# Patient Record
Sex: FEMALE | Race: WHITE | NOT HISPANIC OR LATINO | Employment: OTHER | URBAN - METROPOLITAN AREA
[De-identification: names, ages, dates, MRNs, and addresses within clinical notes are randomized per-mention and may not be internally consistent; named-entity substitution may affect disease eponyms.]

---

## 2023-08-01 LAB
ALANINE AMINOTRANSFERASE (SGPT) (U/L) IN SER/PLAS: 11 U/L (ref 7–45)
ALBUMIN (G/DL) IN SER/PLAS: 4.3 G/DL (ref 3.4–5)
ALKALINE PHOSPHATASE (U/L) IN SER/PLAS: 30 U/L (ref 33–136)
ANION GAP IN SER/PLAS: 10 MMOL/L (ref 10–20)
ASPARTATE AMINOTRANSFERASE (SGOT) (U/L) IN SER/PLAS: 14 U/L (ref 9–39)
BASOPHILS (10*3/UL) IN BLOOD BY AUTOMATED COUNT: 0.1 X10E9/L (ref 0–0.1)
BASOPHILS/100 LEUKOCYTES IN BLOOD BY AUTOMATED COUNT: 1.7 % (ref 0–2)
BILIRUBIN TOTAL (MG/DL) IN SER/PLAS: 0.4 MG/DL (ref 0–1.2)
CALCIUM (MG/DL) IN SER/PLAS: 8.9 MG/DL (ref 8.6–10.3)
CARBON DIOXIDE, TOTAL (MMOL/L) IN SER/PLAS: 32 MMOL/L (ref 21–32)
CHLORIDE (MMOL/L) IN SER/PLAS: 102 MMOL/L (ref 98–107)
CHOLESTEROL (MG/DL) IN SER/PLAS: 201 MG/DL (ref 0–199)
CHOLESTEROL IN HDL (MG/DL) IN SER/PLAS: 70.8 MG/DL
CHOLESTEROL/HDL RATIO: 2.8
CREATININE (MG/DL) IN SER/PLAS: 0.69 MG/DL (ref 0.5–1.05)
EOSINOPHILS (10*3/UL) IN BLOOD BY AUTOMATED COUNT: 0.17 X10E9/L (ref 0–0.4)
EOSINOPHILS/100 LEUKOCYTES IN BLOOD BY AUTOMATED COUNT: 2.9 % (ref 0–6)
ERYTHROCYTE DISTRIBUTION WIDTH (RATIO) BY AUTOMATED COUNT: 12.4 % (ref 11.5–14.5)
ERYTHROCYTE MEAN CORPUSCULAR HEMOGLOBIN CONCENTRATION (G/DL) BY AUTOMATED: 33.4 G/DL (ref 32–36)
ERYTHROCYTE MEAN CORPUSCULAR VOLUME (FL) BY AUTOMATED COUNT: 93 FL (ref 80–100)
ERYTHROCYTES (10*6/UL) IN BLOOD BY AUTOMATED COUNT: 4.05 X10E12/L (ref 4–5.2)
ESTIMATED AVERAGE GLUCOSE FOR HBA1C: 134 MG/DL
GFR FEMALE: >90 ML/MIN/1.73M2
GLUCOSE (MG/DL) IN SER/PLAS: 87 MG/DL (ref 74–99)
HEMATOCRIT (%) IN BLOOD BY AUTOMATED COUNT: 37.7 % (ref 36–46)
HEMOGLOBIN (G/DL) IN BLOOD: 12.6 G/DL (ref 12–16)
HEMOGLOBIN A1C/HEMOGLOBIN TOTAL IN BLOOD: 6.3 %
IMMATURE GRANULOCYTES/100 LEUKOCYTES IN BLOOD BY AUTOMATED COUNT: 0.2 % (ref 0–0.9)
LDL: 115 MG/DL (ref 0–99)
LEUKOCYTES (10*3/UL) IN BLOOD BY AUTOMATED COUNT: 5.9 X10E9/L (ref 4.4–11.3)
LYMPHOCYTES (10*3/UL) IN BLOOD BY AUTOMATED COUNT: 2.36 X10E9/L (ref 0.8–3)
LYMPHOCYTES/100 LEUKOCYTES IN BLOOD BY AUTOMATED COUNT: 40 % (ref 13–44)
MONOCYTES (10*3/UL) IN BLOOD BY AUTOMATED COUNT: 0.53 X10E9/L (ref 0.05–0.8)
MONOCYTES/100 LEUKOCYTES IN BLOOD BY AUTOMATED COUNT: 9 % (ref 2–10)
NEUTROPHILS (10*3/UL) IN BLOOD BY AUTOMATED COUNT: 2.73 X10E9/L (ref 1.6–5.5)
NEUTROPHILS/100 LEUKOCYTES IN BLOOD BY AUTOMATED COUNT: 46.2 % (ref 40–80)
PLATELETS (10*3/UL) IN BLOOD AUTOMATED COUNT: 279 X10E9/L (ref 150–450)
POTASSIUM (MMOL/L) IN SER/PLAS: 4.5 MMOL/L (ref 3.5–5.3)
PROTEIN TOTAL: 6.5 G/DL (ref 6.4–8.2)
SODIUM (MMOL/L) IN SER/PLAS: 139 MMOL/L (ref 136–145)
TRIGLYCERIDE (MG/DL) IN SER/PLAS: 77 MG/DL (ref 0–149)
UREA NITROGEN (MG/DL) IN SER/PLAS: 12 MG/DL (ref 6–23)
VLDL: 15 MG/DL (ref 0–40)

## 2023-08-07 ENCOUNTER — OFFICE VISIT (OUTPATIENT)
Dept: PRIMARY CARE | Facility: CLINIC | Age: 73
End: 2023-08-07
Payer: MEDICARE

## 2023-08-07 VITALS
RESPIRATION RATE: 18 BRPM | TEMPERATURE: 97.3 F | DIASTOLIC BLOOD PRESSURE: 75 MMHG | SYSTOLIC BLOOD PRESSURE: 119 MMHG | HEART RATE: 62 BPM | HEIGHT: 63 IN | BODY MASS INDEX: 28.24 KG/M2 | OXYGEN SATURATION: 94 % | WEIGHT: 159.4 LBS

## 2023-08-07 DIAGNOSIS — R73.03 PREDIABETES: ICD-10-CM

## 2023-08-07 DIAGNOSIS — I10 BENIGN ESSENTIAL HYPERTENSION: Primary | ICD-10-CM

## 2023-08-07 DIAGNOSIS — J30.2 SEASONAL ALLERGIES: ICD-10-CM

## 2023-08-07 DIAGNOSIS — M81.0 AGE-RELATED OSTEOPOROSIS WITHOUT CURRENT PATHOLOGICAL FRACTURE: ICD-10-CM

## 2023-08-07 DIAGNOSIS — K21.9 GASTROESOPHAGEAL REFLUX DISEASE WITHOUT ESOPHAGITIS: ICD-10-CM

## 2023-08-07 PROBLEM — M65.30 TRIGGER FINGER: Status: ACTIVE | Noted: 2023-08-07

## 2023-08-07 PROBLEM — M75.20 BICEPS TENDONITIS: Status: RESOLVED | Noted: 2023-08-07 | Resolved: 2023-08-07

## 2023-08-07 PROBLEM — I73.00 RAYNAUD'S DISEASE WITHOUT GANGRENE: Status: ACTIVE | Noted: 2023-08-07

## 2023-08-07 PROBLEM — M19.049 ARTHRITIS OF HAND: Status: ACTIVE | Noted: 2023-08-07

## 2023-08-07 PROBLEM — N39.46 MIXED STRESS AND URGE URINARY INCONTINENCE: Status: ACTIVE | Noted: 2023-08-07

## 2023-08-07 PROCEDURE — 99214 OFFICE O/P EST MOD 30 MIN: CPT | Performed by: FAMILY MEDICINE

## 2023-08-07 PROCEDURE — 1159F MED LIST DOCD IN RCRD: CPT | Performed by: FAMILY MEDICINE

## 2023-08-07 PROCEDURE — 3074F SYST BP LT 130 MM HG: CPT | Performed by: FAMILY MEDICINE

## 2023-08-07 PROCEDURE — 1170F FXNL STATUS ASSESSED: CPT | Performed by: FAMILY MEDICINE

## 2023-08-07 PROCEDURE — 1036F TOBACCO NON-USER: CPT | Performed by: FAMILY MEDICINE

## 2023-08-07 PROCEDURE — 3078F DIAST BP <80 MM HG: CPT | Performed by: FAMILY MEDICINE

## 2023-08-07 RX ORDER — SIMETHICONE 125 MG
TABLET,CHEWABLE ORAL
COMMUNITY
Start: 2022-11-21 | End: 2023-08-07 | Stop reason: ALTCHOICE

## 2023-08-07 RX ORDER — METFORMIN HYDROCHLORIDE 500 MG/1
500 TABLET, EXTENDED RELEASE ORAL DAILY
Qty: 90 TABLET | Refills: 3 | Status: SHIPPED | OUTPATIENT
Start: 2023-08-07 | End: 2024-01-04 | Stop reason: SDUPTHER

## 2023-08-07 RX ORDER — LOSARTAN POTASSIUM AND HYDROCHLOROTHIAZIDE 25; 100 MG/1; MG/1
1 TABLET ORAL DAILY
Qty: 90 TABLET | Refills: 3 | Status: SHIPPED | OUTPATIENT
Start: 2023-08-07 | End: 2024-01-04 | Stop reason: SDUPTHER

## 2023-08-07 RX ORDER — ASCORBIC ACID 500 MG
TABLET,CHEWABLE ORAL
COMMUNITY
Start: 2022-11-21

## 2023-08-07 RX ORDER — FLUTICASONE PROPIONATE 50 MCG
1 SPRAY, SUSPENSION (ML) NASAL DAILY
COMMUNITY

## 2023-08-07 RX ORDER — OMEPRAZOLE 40 MG/1
40 CAPSULE, DELAYED RELEASE ORAL DAILY
Qty: 90 CAPSULE | Refills: 3 | Status: SHIPPED | OUTPATIENT
Start: 2023-08-07 | End: 2024-08-06

## 2023-08-07 RX ORDER — ALENDRONATE SODIUM 70 MG/1
70 TABLET ORAL
Qty: 12 TABLET | Refills: 3 | Status: SHIPPED | OUTPATIENT
Start: 2023-08-07 | End: 2023-08-07 | Stop reason: SDUPTHER

## 2023-08-07 RX ORDER — METFORMIN HYDROCHLORIDE 500 MG/1
500 TABLET, EXTENDED RELEASE ORAL DAILY
COMMUNITY
Start: 2023-06-22 | End: 2023-08-07 | Stop reason: SDUPTHER

## 2023-08-07 RX ORDER — LOSARTAN POTASSIUM AND HYDROCHLOROTHIAZIDE 25; 100 MG/1; MG/1
1 TABLET ORAL DAILY
COMMUNITY
End: 2023-08-07 | Stop reason: SDUPTHER

## 2023-08-07 RX ORDER — ASPIRIN 81 MG/1
TABLET ORAL
COMMUNITY
Start: 2021-11-12

## 2023-08-07 RX ORDER — ALENDRONATE SODIUM 70 MG/1
70 TABLET ORAL
Qty: 12 TABLET | Refills: 3 | Status: SHIPPED | OUTPATIENT
Start: 2023-08-07 | End: 2024-02-07 | Stop reason: SINTOL

## 2023-08-07 RX ORDER — ALENDRONATE SODIUM 70 MG/1
70 TABLET ORAL
COMMUNITY
Start: 2023-08-07 | End: 2023-08-07 | Stop reason: SDUPTHER

## 2023-08-07 RX ORDER — OMEPRAZOLE 40 MG/1
40 CAPSULE, DELAYED RELEASE ORAL DAILY
COMMUNITY
Start: 2021-11-12 | End: 2023-08-07 | Stop reason: SDUPTHER

## 2023-08-07 ASSESSMENT — ACTIVITIES OF DAILY LIVING (ADL)
NEEDS ASSISTANCE WITH FOOD: INDEPENDENT
WALKS IN HOME: INDEPENDENT
HEARING - RIGHT EAR: FUNCTIONAL
TOILETING: INDEPENDENT
PILL BOX USED: YES
JUDGMENT_ADEQUATE_SAFELY_COMPLETE_DAILY_ACTIVITIES: YES
USING TRANSPORTATION: INDEPENDENT
DOING HOUSEWORK: INDEPENDENT
FEEDING YOURSELF: INDEPENDENT
GROCERY SHOPPING: INDEPENDENT
MANAGING FINANCES: INDEPENDENT
ASSISTIVE_DEVICE: EYEGLASSES
STIL DRIVING: YES
USING TELEPHONE: INDEPENDENT
PREPARING MEALS: INDEPENDENT
ADEQUATE_TO_COMPLETE_ADL: YES
HEARING - LEFT EAR: FUNCTIONAL
DRESSING YOURSELF: INDEPENDENT
EATING: INDEPENDENT
BATHING: INDEPENDENT
PATIENT'S MEMORY ADEQUATE TO SAFELY COMPLETE DAILY ACTIVITIES?: YES
GROOMING: INDEPENDENT

## 2023-08-07 ASSESSMENT — ENCOUNTER SYMPTOMS
LOSS OF SENSATION IN FEET: 0
OCCASIONAL FEELINGS OF UNSTEADINESS: 0
DEPRESSION: 0

## 2023-08-07 ASSESSMENT — COLUMBIA-SUICIDE SEVERITY RATING SCALE - C-SSRS
1. IN THE PAST MONTH, HAVE YOU WISHED YOU WERE DEAD OR WISHED YOU COULD GO TO SLEEP AND NOT WAKE UP?: NO
6. HAVE YOU EVER DONE ANYTHING, STARTED TO DO ANYTHING, OR PREPARED TO DO ANYTHING TO END YOUR LIFE?: NO
2. HAVE YOU ACTUALLY HAD ANY THOUGHTS OF KILLING YOURSELF?: NO

## 2023-08-07 ASSESSMENT — PATIENT HEALTH QUESTIONNAIRE - PHQ9
1. LITTLE INTEREST OR PLEASURE IN DOING THINGS: NOT AT ALL
2. FEELING DOWN, DEPRESSED OR HOPELESS: NOT AT ALL
SUM OF ALL RESPONSES TO PHQ9 QUESTIONS 1 AND 2: 0

## 2023-08-07 NOTE — PROGRESS NOTES
Subjective   Patient ID: Aminta Renae is a 73 y.o. female who presents for Follow-up.    Here for 6 month follow up. Reviewed prior note with patient.     Prediabetes - on Metformin without issues. Healthy diet. Had labs done.     Osteoporosis - started Alendronate last visit and tolerating it well. On Calcium and Vitamin D as well.    HTN - has been stable on medications. No side effects. Feeling well.     Reviewed medications andf reconciled with prior chart.                Current Outpatient Medications:     ascorbic acid (Strawberry C) 500 mg chewable tablet, Chew., Disp: , Rfl:     aspirin 81 mg EC tablet, Take by mouth., Disp: , Rfl:     fluticasone (Flonase) 50 mcg/actuation nasal spray, Administer 1 spray into each nostril once daily., Disp: , Rfl:     albuterol (Ventolin HFA) 90 mcg/actuation inhaler, Inhale 2 puffs 4 times a day as needed for wheezing., Disp: , Rfl:     alendronate (Fosamax) 70 mg tablet, Take 1 tablet (70 mg) by mouth every 7 days., Disp: 12 tablet, Rfl: 3    losartan-hydrochlorothiazide (Hyzaar) 100-25 mg tablet, Take 1 tablet by mouth once daily., Disp: 90 tablet, Rfl: 3    metFORMIN XR (Glucophage-XR) 500 mg 24 hr tablet, Take 1 tablet (500 mg) by mouth once daily. as directed, Disp: 90 tablet, Rfl: 3    omeprazole (PriLOSEC) 40 mg DR capsule, Take 1 capsule (40 mg) by mouth once daily., Disp: 90 capsule, Rfl: 3    Patient Active Problem List   Diagnosis    Acid reflux    Benign essential hypertension    Arthritis of hand    Mixed stress and urge urinary incontinence    Prediabetes    Raynaud's disease without gangrene    Seasonal allergies    Trigger finger         Review of Systems   Constitutional:  Negative for appetite change, fatigue and unexpected weight change.   HENT:  Negative for trouble swallowing.    Respiratory:  Negative for shortness of breath.    Cardiovascular:  Negative for chest pain, palpitations and leg swelling.   Gastrointestinal:  Negative for diarrhea and  "nausea.   Endocrine: Negative for cold intolerance, heat intolerance, polydipsia, polyphagia and polyuria.   Musculoskeletal:  Negative for myalgias.   Skin:  Negative for rash.   Neurological:  Negative for dizziness and headaches.       Objective   /75 (BP Location: Left arm, Patient Position: Sitting, BP Cuff Size: Adult)   Pulse 62   Temp 36.3 °C (97.3 °F)   Resp 18   Ht 1.6 m (5' 3\")   Wt 72.3 kg (159 lb 6.4 oz)   SpO2 94%   BMI 28.24 kg/m²     Physical Exam  Vitals reviewed.   Constitutional:       General: She is not in acute distress.     Appearance: She is not ill-appearing.   Neck:      Vascular: No carotid bruit.   Cardiovascular:      Rate and Rhythm: Normal rate and regular rhythm.      Pulses: Normal pulses.      Heart sounds: No murmur heard.  Pulmonary:      Effort: Pulmonary effort is normal.      Breath sounds: Normal breath sounds.   Musculoskeletal:      Left lower leg: No edema.   Skin:     Findings: No rash.   Neurological:      Mental Status: She is alert.   Psychiatric:         Mood and Affect: Mood normal.         Assessment/Plan   Problem List Items Addressed This Visit       Acid reflux     Does well as long as takes Prilosec. Refillled.          Relevant Medications    omeprazole (PriLOSEC) 40 mg DR capsule    Benign essential hypertension - Primary     Doing well on Losartan - hydrochlorothiazide. Labs reassuring. Continue meds. Follow up 6 months at wellness, lab prior.          Relevant Medications    losartan-hydrochlorothiazide (Hyzaar) 100-25 mg tablet    Other Relevant Orders    Comprehensive Metabolic Panel    Lipid Panel    Prediabetes     On Metformin and doing great on it. Continue diet and increased activity. Recheck 6 months with lab prior.          Relevant Medications    losartan-hydrochlorothiazide (Hyzaar) 100-25 mg tablet    metFORMIN XR (Glucophage-XR) 500 mg 24 hr tablet    Other Relevant Orders    Comprehensive Metabolic Panel    Hemoglobin A1C    Lipid " Panel    Seasonal allergies     Flonase helpful. Refilled.           Other Visit Diagnoses       Age-related osteoporosis without current pathological fracture        Relevant Medications    alendronate (Fosamax) 70 mg tablet          Recent Results (from the past 1008 hour(s))   CBC and Auto Differential    Collection Time: 08/01/23  8:13 AM   Result Value Ref Range    WBC 5.9 4.4 - 11.3 x10E9/L    RBC 4.05 4.00 - 5.20 x10E12/L    Hemoglobin 12.6 12.0 - 16.0 g/dL    Hematocrit 37.7 36.0 - 46.0 %    MCV 93 80 - 100 fL    MCHC 33.4 32.0 - 36.0 g/dL    Platelets 279 150 - 450 x10E9/L    RDW 12.4 11.5 - 14.5 %    Neutrophils % 46.2 40.0 - 80.0 %    Immature Granulocytes %, Automated 0.2 0.0 - 0.9 %    Lymphocytes % 40.0 13.0 - 44.0 %    Monocytes % 9.0 2.0 - 10.0 %    Eosinophils % 2.9 0.0 - 6.0 %    Basophils % 1.7 0.0 - 2.0 %    Neutrophils Absolute 2.73 1.60 - 5.50 x10E9/L    Lymphocytes Absolute 2.36 0.80 - 3.00 x10E9/L    Monocytes Absolute 0.53 0.05 - 0.80 x10E9/L    Eosinophils Absolute 0.17 0.00 - 0.40 x10E9/L    Basophils Absolute 0.10 0.00 - 0.10 x10E9/L   Lipid Panel    Collection Time: 08/01/23  8:13 AM   Result Value Ref Range    Cholesterol 201 (H) 0 - 199 mg/dL    HDL 70.8 mg/dL    Cholesterol/HDL Ratio 2.8      (H) 0 - 99 mg/dL    VLDL 15 0 - 40 mg/dL    Triglycerides 77 0 - 149 mg/dL   Hemoglobin A1C    Collection Time: 08/01/23  8:13 AM   Result Value Ref Range    Hemoglobin A1C 6.3 (A) %    Estimated Average Glucose 134 MG/DL   Comprehensive Metabolic Panel    Collection Time: 08/01/23  8:13 AM   Result Value Ref Range    Glucose 87 74 - 99 mg/dL    Sodium 139 136 - 145 mmol/L    Potassium 4.5 3.5 - 5.3 mmol/L    Chloride 102 98 - 107 mmol/L    Bicarbonate 32 21 - 32 mmol/L    Anion Gap 10 10 - 20 mmol/L    Urea Nitrogen 12 6 - 23 mg/dL    Creatinine 0.69 0.50 - 1.05 mg/dL    GFR Female >90 >90 mL/min/1.73m2    Calcium 8.9 8.6 - 10.3 mg/dL    Albumin 4.3 3.4 - 5.0 g/dL    Alkaline Phosphatase  30 (L) 33 - 136 U/L    Total Protein 6.5 6.4 - 8.2 g/dL    AST 14 9 - 39 U/L    Total Bilirubin 0.4 0.0 - 1.2 mg/dL    ALT (SGPT) 11 7 - 45 U/L         Assessment, plans, tests, and follow up discussed with patient and patient verbalized understanding. Aminta was given an opportunity to ask questions and  any concerns were addressed including but not limited to meds, labs, goaals, followup, immunizations. .

## 2023-08-11 RX ORDER — ALBUTEROL SULFATE 90 UG/1
2 AEROSOL, METERED RESPIRATORY (INHALATION) 4 TIMES DAILY PRN
COMMUNITY
End: 2024-02-07 | Stop reason: ALTCHOICE

## 2023-08-11 ASSESSMENT — ENCOUNTER SYMPTOMS
PALPITATIONS: 0
UNEXPECTED WEIGHT CHANGE: 0
DIARRHEA: 0
DIZZINESS: 0
NAUSEA: 0
POLYDIPSIA: 0
FATIGUE: 0
APPETITE CHANGE: 0
MYALGIAS: 0
HEADACHES: 0
TROUBLE SWALLOWING: 0
POLYPHAGIA: 0
SHORTNESS OF BREATH: 0

## 2023-08-11 NOTE — ASSESSMENT & PLAN NOTE
Doing well on Losartan - hydrochlorothiazide. Labs reassuring. Continue meds. Follow up 6 months at wellness, lab prior.

## 2023-08-11 NOTE — ASSESSMENT & PLAN NOTE
On Metformin and doing great on it. Continue diet and increased activity. Recheck 6 months with lab prior.

## 2024-01-04 ENCOUNTER — TELEPHONE (OUTPATIENT)
Dept: PRIMARY CARE | Facility: CLINIC | Age: 74
End: 2024-01-04
Payer: MEDICARE

## 2024-01-04 DIAGNOSIS — I10 BENIGN ESSENTIAL HYPERTENSION: ICD-10-CM

## 2024-01-04 DIAGNOSIS — R73.03 PREDIABETES: ICD-10-CM

## 2024-01-04 RX ORDER — LOSARTAN POTASSIUM AND HYDROCHLOROTHIAZIDE 25; 100 MG/1; MG/1
1 TABLET ORAL DAILY
Qty: 30 TABLET | Refills: 0 | Status: SHIPPED | OUTPATIENT
Start: 2024-01-04 | End: 2024-02-07 | Stop reason: SDUPTHER

## 2024-01-04 RX ORDER — METFORMIN HYDROCHLORIDE 500 MG/1
500 TABLET, EXTENDED RELEASE ORAL DAILY
Qty: 30 TABLET | Refills: 0 | Status: SHIPPED | OUTPATIENT
Start: 2024-01-04 | End: 2024-02-07 | Stop reason: SDUPTHER

## 2024-01-04 NOTE — TELEPHONE ENCOUNTER
Rx Refill Request Telephone Encounter    Name:  Aminta Renae  :  [id number]  Medication Name:    metFORMIN XR (Glucophage-XR) 500 mg 24 hr tablet       Take 1 tablet (500 mg) by mouth once daily. as directed   90    Specific Pharmacy location:  [facility]  Date of last appointment:    Date of next appointment:    Best number to reach patient:  [phone]          Rx Refill Request Telephone Encounter    Name:  Aminta Renae  :  [id number]  Medication Name:  losartan-hydrochlorothiazide (Hyzaar) 100-25 mg tablet     oral  daily  90      Patient is out of this medication   Please route to [facility]

## 2024-02-02 ENCOUNTER — LAB (OUTPATIENT)
Dept: LAB | Facility: LAB | Age: 74
End: 2024-02-02
Payer: MEDICARE

## 2024-02-02 DIAGNOSIS — R73.03 PREDIABETES: ICD-10-CM

## 2024-02-02 DIAGNOSIS — I10 BENIGN ESSENTIAL HYPERTENSION: ICD-10-CM

## 2024-02-02 LAB
ALBUMIN SERPL BCP-MCNC: 4.3 G/DL (ref 3.4–5)
ALP SERPL-CCNC: 35 U/L (ref 33–136)
ALT SERPL W P-5'-P-CCNC: 10 U/L (ref 7–45)
ANION GAP SERPL CALC-SCNC: 13 MMOL/L (ref 10–20)
AST SERPL W P-5'-P-CCNC: 14 U/L (ref 9–39)
BILIRUB SERPL-MCNC: 0.5 MG/DL (ref 0–1.2)
BUN SERPL-MCNC: 12 MG/DL (ref 6–23)
CALCIUM SERPL-MCNC: 9 MG/DL (ref 8.6–10.3)
CHLORIDE SERPL-SCNC: 101 MMOL/L (ref 98–107)
CHOLEST SERPL-MCNC: 233 MG/DL (ref 0–199)
CHOLESTEROL/HDL RATIO: 3.1
CO2 SERPL-SCNC: 29 MMOL/L (ref 21–32)
CREAT SERPL-MCNC: 0.67 MG/DL (ref 0.5–1.05)
EGFRCR SERPLBLD CKD-EPI 2021: >90 ML/MIN/1.73M*2
EST. AVERAGE GLUCOSE BLD GHB EST-MCNC: 128 MG/DL
GLUCOSE SERPL-MCNC: 86 MG/DL (ref 74–99)
HBA1C MFR BLD: 6.1 %
HDLC SERPL-MCNC: 74 MG/DL
LDLC SERPL CALC-MCNC: 144 MG/DL
NON HDL CHOLESTEROL: 159 MG/DL (ref 0–149)
POTASSIUM SERPL-SCNC: 4 MMOL/L (ref 3.5–5.3)
PROT SERPL-MCNC: 6.6 G/DL (ref 6.4–8.2)
SODIUM SERPL-SCNC: 139 MMOL/L (ref 136–145)
TRIGL SERPL-MCNC: 74 MG/DL (ref 0–149)
VLDL: 15 MG/DL (ref 0–40)

## 2024-02-02 PROCEDURE — 83036 HEMOGLOBIN GLYCOSYLATED A1C: CPT

## 2024-02-02 PROCEDURE — 80061 LIPID PANEL: CPT

## 2024-02-02 PROCEDURE — 80053 COMPREHEN METABOLIC PANEL: CPT

## 2024-02-02 PROCEDURE — 36415 COLL VENOUS BLD VENIPUNCTURE: CPT

## 2024-02-07 ENCOUNTER — OFFICE VISIT (OUTPATIENT)
Dept: PRIMARY CARE | Facility: CLINIC | Age: 74
End: 2024-02-07
Payer: MEDICARE

## 2024-02-07 VITALS
SYSTOLIC BLOOD PRESSURE: 129 MMHG | OXYGEN SATURATION: 91 % | BODY MASS INDEX: 29.2 KG/M2 | DIASTOLIC BLOOD PRESSURE: 74 MMHG | HEART RATE: 69 BPM | WEIGHT: 164.8 LBS | RESPIRATION RATE: 16 BRPM | TEMPERATURE: 96.8 F | HEIGHT: 63 IN

## 2024-02-07 DIAGNOSIS — Z12.31 ENCOUNTER FOR SCREENING MAMMOGRAM FOR BREAST CANCER: ICD-10-CM

## 2024-02-07 DIAGNOSIS — E78.2 MIXED HYPERLIPIDEMIA: ICD-10-CM

## 2024-02-07 DIAGNOSIS — Z11.59 ENCOUNTER FOR HEPATITIS C SCREENING TEST FOR LOW RISK PATIENT: ICD-10-CM

## 2024-02-07 DIAGNOSIS — Z91.89 FRAMINGHAM CARDIAC RISK 10-20% IN NEXT 10 YEARS: ICD-10-CM

## 2024-02-07 DIAGNOSIS — Z00.00 WELL ADULT EXAM: Primary | ICD-10-CM

## 2024-02-07 DIAGNOSIS — Z23 ENCOUNTER FOR VACCINATION: ICD-10-CM

## 2024-02-07 DIAGNOSIS — K21.9 GASTROESOPHAGEAL REFLUX DISEASE WITHOUT ESOPHAGITIS: ICD-10-CM

## 2024-02-07 DIAGNOSIS — R73.03 PREDIABETES: ICD-10-CM

## 2024-02-07 DIAGNOSIS — Z12.31 ENCOUNTER FOR SCREENING MAMMOGRAM FOR MALIGNANT NEOPLASM OF BREAST: ICD-10-CM

## 2024-02-07 DIAGNOSIS — I10 BENIGN ESSENTIAL HYPERTENSION: ICD-10-CM

## 2024-02-07 DIAGNOSIS — M81.0 SENILE OSTEOPOROSIS: ICD-10-CM

## 2024-02-07 PROBLEM — K29.50 UNSPECIFIED CHRONIC GASTRITIS WITHOUT BLEEDING: Status: RESOLVED | Noted: 2022-11-29 | Resolved: 2024-02-07

## 2024-02-07 PROBLEM — M06.9 RHEUMATOID ARTHRITIS, UNSPECIFIED (MULTI): Status: RESOLVED | Noted: 2023-02-17 | Resolved: 2024-02-07

## 2024-02-07 PROBLEM — I73.00 RAYNAUD'S SYNDROME WITHOUT GANGRENE: Status: ACTIVE | Noted: 2022-11-29

## 2024-02-07 PROBLEM — H54.7 UNSPECIFIED VISUAL LOSS: Status: ACTIVE | Noted: 2022-11-29

## 2024-02-07 PROBLEM — U07.1 COVID-19: Status: RESOLVED | Noted: 2022-10-14 | Resolved: 2024-02-07

## 2024-02-07 PROBLEM — K44.9 DIAPHRAGMATIC HERNIA WITHOUT OBSTRUCTION OR GANGRENE: Status: RESOLVED | Noted: 2022-11-29 | Resolved: 2024-02-07

## 2024-02-07 PROBLEM — M06.9 RHEUMATOID ARTHRITIS, UNSPECIFIED (MULTI): Status: ACTIVE | Noted: 2023-02-17

## 2024-02-07 PROBLEM — G62.9 POLYNEUROPATHY, UNSPECIFIED: Status: RESOLVED | Noted: 2022-11-29 | Resolved: 2024-02-07

## 2024-02-07 PROBLEM — G62.9 POLYNEUROPATHY, UNSPECIFIED: Status: ACTIVE | Noted: 2022-11-29

## 2024-02-07 PROCEDURE — 1159F MED LIST DOCD IN RCRD: CPT | Performed by: FAMILY MEDICINE

## 2024-02-07 PROCEDURE — 1036F TOBACCO NON-USER: CPT | Performed by: FAMILY MEDICINE

## 2024-02-07 PROCEDURE — 1170F FXNL STATUS ASSESSED: CPT | Performed by: FAMILY MEDICINE

## 2024-02-07 PROCEDURE — G0009 ADMIN PNEUMOCOCCAL VACCINE: HCPCS | Performed by: FAMILY MEDICINE

## 2024-02-07 PROCEDURE — 3078F DIAST BP <80 MM HG: CPT | Performed by: FAMILY MEDICINE

## 2024-02-07 PROCEDURE — 1160F RVW MEDS BY RX/DR IN RCRD: CPT | Performed by: FAMILY MEDICINE

## 2024-02-07 PROCEDURE — 90671 PCV15 VACCINE IM: CPT | Performed by: FAMILY MEDICINE

## 2024-02-07 PROCEDURE — G0439 PPPS, SUBSEQ VISIT: HCPCS | Performed by: FAMILY MEDICINE

## 2024-02-07 PROCEDURE — 99213 OFFICE O/P EST LOW 20 MIN: CPT | Performed by: FAMILY MEDICINE

## 2024-02-07 PROCEDURE — 3074F SYST BP LT 130 MM HG: CPT | Performed by: FAMILY MEDICINE

## 2024-02-07 PROCEDURE — 1123F ACP DISCUSS/DSCN MKR DOCD: CPT | Performed by: FAMILY MEDICINE

## 2024-02-07 RX ORDER — ATORVASTATIN CALCIUM 20 MG/1
20 TABLET, FILM COATED ORAL DAILY
Qty: 90 TABLET | Refills: 3 | Status: SHIPPED | OUTPATIENT
Start: 2024-02-07 | End: 2025-02-06

## 2024-02-07 RX ORDER — CALCIUM CARBONATE 300MG(750)
TABLET,CHEWABLE ORAL
COMMUNITY
Start: 2021-11-12

## 2024-02-07 RX ORDER — METFORMIN HYDROCHLORIDE 500 MG/1
500 TABLET, EXTENDED RELEASE ORAL DAILY
Qty: 90 TABLET | Refills: 3 | Status: SHIPPED | OUTPATIENT
Start: 2024-02-07 | End: 2025-02-06

## 2024-02-07 RX ORDER — L.ACID,FERM,PLA,RHA/B.BIF,LONG 126 MG
TABLET, DELAYED AND EXTENDED RELEASE ORAL
COMMUNITY
Start: 2021-11-12

## 2024-02-07 RX ORDER — CALCIUM CARBONATE/VITAMIN D3 600MG-5MCG
1 TABLET ORAL 2 TIMES DAILY
COMMUNITY
Start: 2021-11-12

## 2024-02-07 RX ORDER — LOSARTAN POTASSIUM AND HYDROCHLOROTHIAZIDE 25; 100 MG/1; MG/1
1 TABLET ORAL DAILY
Qty: 90 TABLET | Refills: 3 | Status: SHIPPED | OUTPATIENT
Start: 2024-02-07 | End: 2025-02-06

## 2024-02-07 ASSESSMENT — ENCOUNTER SYMPTOMS
NAUSEA: 0
DIFFICULTY URINATING: 0
COUGH: 0
VOMITING: 0
HEADACHES: 0
CONSTIPATION: 0
LOSS OF SENSATION IN FEET: 0
BLOOD IN STOOL: 0
TROUBLE SWALLOWING: 0
DEPRESSION: 0
ACTIVITY CHANGE: 0
NERVOUS/ANXIOUS: 0
POLYDIPSIA: 0
ABDOMINAL PAIN: 0
FATIGUE: 0
JOINT SWELLING: 0
POLYPHAGIA: 0
ARTHRALGIAS: 0
OCCASIONAL FEELINGS OF UNSTEADINESS: 0
DIARRHEA: 0
DYSPHORIC MOOD: 0
CONFUSION: 0
PALPITATIONS: 0
APPETITE CHANGE: 0
SHORTNESS OF BREATH: 0
WHEEZING: 0
UNEXPECTED WEIGHT CHANGE: 0
SEIZURES: 0

## 2024-02-07 ASSESSMENT — ACTIVITIES OF DAILY LIVING (ADL)
GROCERY_SHOPPING: INDEPENDENT
MANAGING_FINANCES: INDEPENDENT
DRESSING: INDEPENDENT
BATHING: INDEPENDENT
TAKING_MEDICATION: INDEPENDENT
DOING_HOUSEWORK: INDEPENDENT

## 2024-02-07 ASSESSMENT — PATIENT HEALTH QUESTIONNAIRE - PHQ9
SUM OF ALL RESPONSES TO PHQ9 QUESTIONS 1 AND 2: 0
1. LITTLE INTEREST OR PLEASURE IN DOING THINGS: NOT AT ALL
2. FEELING DOWN, DEPRESSED OR HOPELESS: NOT AT ALL

## 2024-02-07 NOTE — ASSESSMENT & PLAN NOTE
On Calcium with Vitamin d, Alendronate, weight bearing activity. Last Dexa 2/17/2023. Due for repeat Feb 2025

## 2024-02-07 NOTE — ASSESSMENT & PLAN NOTE
A1C 6.1, on Metformin without issue. Continue. Recheck 6 months. Discussed goal of diet, exercise and mitzy loss.

## 2024-02-07 NOTE — PATIENT INSTRUCTIONS
Added Atorvastatin 20 mg daily    Mammogram and Coronary Artery Calcium CT ordered.     If Voltaren gel not helpful.     You had Prevnar.

## 2024-02-07 NOTE — PROGRESS NOTES
Subjective   Patient ID: Aminta Renae is a 73 y.o. female who presents for Medicare Wellness.    Here to follow up on chronic conditions and for wellness. Had labs done.     Hypertension - stable on medication without side effects, Has been on same dose for a while.     Prediabetes - on Metformin. Working on diet. Increased activity some. No side effects.     GERD - gets sx if skips Priolosec. No difficulty swallowing.     Hyperlipidemia - not on statin. Last LDL was 115 and not felt to be appropriate. Repeat lab done.     ASCVD risk 17.6 - LDL is up to 144.     Osteoporosis - on Fosamax, Calcium Vitamin d, weight bearing exercise. No side effects. Stopped Alendronate as hard to swallow.     Here for annual wellness exam. Last wellness 1 yr.     New concerns:no  Feels: well    Changes  to health/medications since last visit No   Other providers seen since last visit none  Periods: na  Contraception: not applicable    Healthy lifestyle habits: Regular exercise: Yes                                         Dietary habits: improved                                        Social connections/relationships good                                        Wears seatbelts Yes                                         Sunscreen Yes                                         Sexual Risk Factors No        Health screenings:  Pap smear: aged out                                  Mammogram was due 2 months ago                                  Self-breast Exam No                                   Colon screening Cologuard negative 11/18/2022. Due November 2025                                  Dexa 2/17/2023, osteoporosis, repeat 2 yrs                                  Hep C screening No                                   HIV screening no                                  STI screeningnot applicable                          Health risks: Domestic Violence no                      Work-life balance n/a                      Smoke detectors Yes                        Carbon monoxide detectors yes                      Gun safety not applicable                      Second-hand Smoke No                       Occupational Risks no    Immunizations:  Influenza:  Yes                             Tdap: 2019                            HPV: not applicable                           Pneumonia: Pneumovax 23 in 2022, due for Prevanr                           Shingrix: no                           COVID: times 6, had booster Oct 2023.             Patient Care Team:  Jyothi Magallon MD as PCP - General (Family Medicine)  Jyothi Magallon MD as PCP - [facility] Attributed Provider    Patient Active Problem List   Diagnosis    Acid reflux    Benign essential hypertension    Arthritis of hand    Mixed stress and urge urinary incontinence    Prediabetes    Raynaud's syndrome without gangrene    Seasonal allergies    Trigger finger    Unspecified visual loss    Senile osteoporosis    [place] cardiac risk 10-20% in next 10 years         Current Outpatient Medications:     ascorbic acid (Strawberry C) 500 mg chewable tablet, Chew., Disp: , Rfl:     aspirin 81 mg EC tablet, Take by mouth., Disp: , Rfl:     calcium carbonate-vitamin D3 600 mg-5 mcg (200 unit) tablet, Take 1 tablet by mouth 2 times a day., Disp: , Rfl:     fluticasone (Flonase) 50 mcg/actuation nasal spray, Administer 1 spray into each nostril once daily., Disp: , Rfl:     magnesium oxide (Mag-Ox) 400 mg tablet, Take by mouth., Disp: , Rfl:     omeprazole (PriLOSEC) 40 mg DR capsule, Take 1 capsule (40 mg) by mouth once daily., Disp: 90 capsule, Rfl: 3    atorvastatin (Lipitor) 20 mg tablet, Take 1 tablet (20 mg) by mouth once daily., Disp: 90 tablet, Rfl: 3    L.acid,ferm,julio,rha-B.bif,long (Controlled Delivery Probiotic) 126 mg (2 billion cell) tablet,delayed and ext.release, Take by mouth., Disp: , Rfl:     losartan-hydrochlorothiazide (Hyzaar) 100-25 mg tablet, Take 1 tablet by mouth once daily., Disp: 90  "tablet, Rfl: 3    metFORMIN  mg 24 hr tablet, Take 1 tablet (500 mg) by mouth once daily. as directed, Disp: 90 tablet, Rfl: 3    Past Medical, Surgical, Family, Social, and Substance Histories Reviewed.     Medicare Wellness Questionnaires and Histories in Nursing Notes Reviewed.     Review of Systems   Constitutional:  Negative for activity change, appetite change, fatigue and unexpected weight change.   HENT:  Negative for dental problem, hearing loss and trouble swallowing.    Eyes:  Negative for visual disturbance.   Respiratory:  Negative for cough, shortness of breath and wheezing.    Cardiovascular:  Negative for chest pain, palpitations and leg swelling.   Gastrointestinal:  Negative for abdominal pain, blood in stool, constipation, diarrhea, nausea and vomiting.   Endocrine: Negative for cold intolerance, heat intolerance, polydipsia, polyphagia and polyuria.   Genitourinary:  Negative for difficulty urinating and menstrual problem.   Musculoskeletal:  Negative for arthralgias and joint swelling.   Neurological:  Negative for seizures, syncope and headaches.   Psychiatric/Behavioral:  Negative for confusion and dysphoric mood. The patient is not nervous/anxious.        Objective   /74 (BP Location: Left arm, Patient Position: Sitting, BP Cuff Size: Adult)   Pulse 69   Temp 36 °C (96.8 °F) (Temporal)   Resp 16   Ht 1.6 m (5' 3\")   Wt 74.8 kg (164 lb 12.8 oz)   SpO2 91%   BMI 29.19 kg/m²     Physical Exam  Constitutional:       Appearance: Normal appearance.   HENT:      Head: Normocephalic and atraumatic.      Right Ear: Tympanic membrane normal.      Left Ear: Tympanic membrane normal.      Nose: Nose normal.      Mouth/Throat:      Pharynx: Oropharynx is clear.   Eyes:      Extraocular Movements: Extraocular movements intact.      Conjunctiva/sclera: Conjunctivae normal.      Pupils: Pupils are equal, round, and reactive to light.   Neck:      Vascular: No carotid bruit. "   Cardiovascular:      Rate and Rhythm: Normal rate and regular rhythm.      Pulses: Normal pulses.      Heart sounds: No murmur heard.  Pulmonary:      Effort: Pulmonary effort is normal.      Breath sounds: Normal breath sounds.   Abdominal:      Palpations: There is no hepatomegaly, splenomegaly or mass.      Tenderness: There is no abdominal tenderness.   Musculoskeletal:         General: Normal range of motion.      Cervical back: Normal range of motion.      Left lower leg: No edema.   Skin:     General: Skin is warm and dry.      Findings: No rash.   Neurological:      General: No focal deficit present.      Mental Status: She is alert. Mental status is at baseline.      Gait: Gait is intact.   Psychiatric:         Mood and Affect: Mood normal.         Thought Content: Thought content normal.       Recent Results (from the past 1008 hour(s))   Comprehensive Metabolic Panel    Collection Time: 02/02/24  8:48 AM   Result Value Ref Range    Glucose 86 74 - 99 mg/dL    Sodium 139 136 - 145 mmol/L    Potassium 4.0 3.5 - 5.3 mmol/L    Chloride 101 98 - 107 mmol/L    Bicarbonate 29 21 - 32 mmol/L    Anion Gap 13 10 - 20 mmol/L    Urea Nitrogen 12 6 - 23 mg/dL    Creatinine 0.67 0.50 - 1.05 mg/dL    eGFR >90 >60 mL/min/1.73m*2    Calcium 9.0 8.6 - 10.3 mg/dL    Albumin 4.3 3.4 - 5.0 g/dL    Alkaline Phosphatase 35 33 - 136 U/L    Total Protein 6.6 6.4 - 8.2 g/dL    AST 14 9 - 39 U/L    Bilirubin, Total 0.5 0.0 - 1.2 mg/dL    ALT 10 7 - 45 U/L   Hemoglobin A1C    Collection Time: 02/02/24  8:48 AM   Result Value Ref Range    Hemoglobin A1C 6.1 (H) see below %    Estimated Average Glucose 128 Not Established mg/dL   Lipid Panel    Collection Time: 02/02/24  8:48 AM   Result Value Ref Range    Cholesterol 233 (H) 0 - 199 mg/dL    HDL-Cholesterol 74.0 mg/dL    Cholesterol/HDL Ratio 3.1     LDL Calculated 144 (H) <=99 mg/dL    VLDL 15 0 - 40 mg/dL    Triglycerides 74 0 - 149 mg/dL    Non HDL Cholesterol 159 (H) 0 - 149  mg/dL         Assessment/Plan   Problem List Items Addressed This Visit       Acid reflux     Taking Prilosec which helps. Unable to wean off it. Continue.          Benign essential hypertension     No side effects on Lisinopril Hydrochlorothiazide. Lab good. Recheck 6 months with lab prior.         Relevant Medications    losartan-hydrochlorothiazide (Hyzaar) 100-25 mg tablet    Other Relevant Orders    CT cardiac scoring wo IV contrast    Follow Up In Primary Care - Established    Prediabetes     A1C 6.1, on Metformin without issue. Continue. Recheck 6 months. Discussed goal of diet, exercise and mitzy loss.          Relevant Medications    losartan-hydrochlorothiazide (Hyzaar) 100-25 mg tablet    metFORMIN  mg 24 hr tablet    Other Relevant Orders    Comprehensive Metabolic Panel    Hemoglobin A1C    Follow Up In Primary Care - Established    Senile osteoporosis     On Calcium with Vitamin d, Alendronate, weight bearing activity. Last Dexa 2/17/2023. Due for repeat Feb 2025         [place] cardiac risk 10-20% in next 10 years     17.6%. Discussed with patient. Discussed CAC Ct. Will Add Atorvastatin         Relevant Orders    Lipid Panel    CT cardiac scoring wo IV contrast    Follow Up In Primary Care - Established     Other Visit Diagnoses       Well adult exam    -  Primary    up to date on screenings except mammogram. Discussed healthy lifestyle, Due for Prevnar 15 and Hep C screen.    Encounter for vaccination        Due for Prenvar 15.    Relevant Orders    Pneumococcal conjugate vaccine, 15-valent (VAXNEUVANCE)    Encounter for screening mammogram for breast cancer        Ordered    Encounter for hepatitis C screening test for low risk patient        Encounter for screening mammogram for malignant neoplasm of breast        Relevant Orders    BI mammo bilateral screening    Mixed hyperlipidemia        Relevant Medications    atorvastatin (Lipitor) 20 mg tablet    Other Relevant Orders    Lipid  Panel    CT cardiac scoring wo IV contrast    Follow Up In Primary Care - Established              Assessment, plans, tests, and follow up discussed with patient and patient verbalized understanding. Aminta was given an opportunity to ask questions and  any concerns were addressed including but not limited to test orders,   .

## 2024-02-19 ENCOUNTER — HOSPITAL ENCOUNTER (OUTPATIENT)
Dept: RADIOLOGY | Facility: HOSPITAL | Age: 74
Discharge: HOME | End: 2024-02-19
Payer: MEDICARE

## 2024-02-19 DIAGNOSIS — Z12.31 ENCOUNTER FOR SCREENING MAMMOGRAM FOR MALIGNANT NEOPLASM OF BREAST: ICD-10-CM

## 2024-02-19 PROCEDURE — 77063 BREAST TOMOSYNTHESIS BI: CPT | Performed by: RADIOLOGY

## 2024-02-19 PROCEDURE — 77067 SCR MAMMO BI INCL CAD: CPT

## 2024-02-19 PROCEDURE — 77067 SCR MAMMO BI INCL CAD: CPT | Performed by: RADIOLOGY

## 2024-03-16 ENCOUNTER — HOSPITAL ENCOUNTER (OUTPATIENT)
Dept: RADIOLOGY | Facility: HOSPITAL | Age: 74
Discharge: HOME | End: 2024-03-16
Payer: MEDICARE

## 2024-03-16 DIAGNOSIS — I10 BENIGN ESSENTIAL HYPERTENSION: ICD-10-CM

## 2024-03-16 DIAGNOSIS — E78.2 MIXED HYPERLIPIDEMIA: ICD-10-CM

## 2024-03-16 DIAGNOSIS — Z91.89 FRAMINGHAM CARDIAC RISK 10-20% IN NEXT 10 YEARS: ICD-10-CM

## 2024-03-16 PROCEDURE — 75571 CT HRT W/O DYE W/CA TEST: CPT

## 2024-03-22 ENCOUNTER — TELEPHONE (OUTPATIENT)
Dept: PRIMARY CARE | Facility: CLINIC | Age: 74
End: 2024-03-22
Payer: MEDICARE

## 2024-03-22 NOTE — TELEPHONE ENCOUNTER
[place] artery calcium CT showed there was very little calcium in her arteries. That is great.     It also showed tiny spot in lung that looks benign. She needs to get LDCT in one yr to follow it up.

## 2024-08-02 ENCOUNTER — LAB (OUTPATIENT)
Dept: LAB | Facility: LAB | Age: 74
End: 2024-08-02
Payer: MEDICARE

## 2024-08-02 DIAGNOSIS — E78.2 MIXED HYPERLIPIDEMIA: ICD-10-CM

## 2024-08-02 DIAGNOSIS — R73.03 PREDIABETES: ICD-10-CM

## 2024-08-02 DIAGNOSIS — Z91.89 FRAMINGHAM CARDIAC RISK 10-20% IN NEXT 10 YEARS: ICD-10-CM

## 2024-08-02 LAB
ALBUMIN SERPL BCP-MCNC: 4.2 G/DL (ref 3.4–5)
ALP SERPL-CCNC: 33 U/L (ref 33–136)
ALT SERPL W P-5'-P-CCNC: 13 U/L (ref 7–45)
ANION GAP SERPL CALC-SCNC: 9 MMOL/L (ref 10–20)
AST SERPL W P-5'-P-CCNC: 15 U/L (ref 9–39)
BILIRUB SERPL-MCNC: 0.4 MG/DL (ref 0–1.2)
BUN SERPL-MCNC: 11 MG/DL (ref 6–23)
CALCIUM SERPL-MCNC: 9.3 MG/DL (ref 8.6–10.3)
CHLORIDE SERPL-SCNC: 101 MMOL/L (ref 98–107)
CHOLEST SERPL-MCNC: 153 MG/DL (ref 0–199)
CHOLESTEROL/HDL RATIO: 2.2
CO2 SERPL-SCNC: 30 MMOL/L (ref 21–32)
CREAT SERPL-MCNC: 0.66 MG/DL (ref 0.5–1.05)
EGFRCR SERPLBLD CKD-EPI 2021: >90 ML/MIN/1.73M*2
EST. AVERAGE GLUCOSE BLD GHB EST-MCNC: 134 MG/DL
GLUCOSE SERPL-MCNC: 89 MG/DL (ref 74–99)
HBA1C MFR BLD: 6.3 %
HDLC SERPL-MCNC: 69 MG/DL
LDLC SERPL CALC-MCNC: 67 MG/DL
NON HDL CHOLESTEROL: 84 MG/DL (ref 0–149)
POTASSIUM SERPL-SCNC: 4.1 MMOL/L (ref 3.5–5.3)
PROT SERPL-MCNC: 6.5 G/DL (ref 6.4–8.2)
SODIUM SERPL-SCNC: 136 MMOL/L (ref 136–145)
TRIGL SERPL-MCNC: 85 MG/DL (ref 0–149)
VLDL: 17 MG/DL (ref 0–40)

## 2024-08-02 PROCEDURE — 36415 COLL VENOUS BLD VENIPUNCTURE: CPT

## 2024-08-02 PROCEDURE — 83036 HEMOGLOBIN GLYCOSYLATED A1C: CPT

## 2024-08-02 PROCEDURE — 80061 LIPID PANEL: CPT

## 2024-08-02 PROCEDURE — 80053 COMPREHEN METABOLIC PANEL: CPT

## 2024-08-07 ENCOUNTER — APPOINTMENT (OUTPATIENT)
Dept: PRIMARY CARE | Facility: CLINIC | Age: 74
End: 2024-08-07
Payer: MEDICARE

## 2024-08-07 VITALS
HEART RATE: 61 BPM | TEMPERATURE: 97.4 F | WEIGHT: 163.2 LBS | HEIGHT: 63 IN | BODY MASS INDEX: 28.92 KG/M2 | DIASTOLIC BLOOD PRESSURE: 76 MMHG | SYSTOLIC BLOOD PRESSURE: 125 MMHG | OXYGEN SATURATION: 94 %

## 2024-08-07 DIAGNOSIS — R73.03 PREDIABETES: ICD-10-CM

## 2024-08-07 DIAGNOSIS — R06.09 EXERTIONAL DYSPNEA: Primary | ICD-10-CM

## 2024-08-07 DIAGNOSIS — I10 BENIGN ESSENTIAL HYPERTENSION: ICD-10-CM

## 2024-08-07 DIAGNOSIS — R07.9 RIGHT-SIDED CHEST PAIN: ICD-10-CM

## 2024-08-07 DIAGNOSIS — Z91.89 FRAMINGHAM CARDIAC RISK 10-20% IN NEXT 10 YEARS: ICD-10-CM

## 2024-08-07 DIAGNOSIS — E78.2 MIXED HYPERLIPIDEMIA: ICD-10-CM

## 2024-08-07 PROCEDURE — 1158F ADVNC CARE PLAN TLK DOCD: CPT | Performed by: FAMILY MEDICINE

## 2024-08-07 PROCEDURE — 3008F BODY MASS INDEX DOCD: CPT | Performed by: FAMILY MEDICINE

## 2024-08-07 PROCEDURE — 99214 OFFICE O/P EST MOD 30 MIN: CPT | Performed by: FAMILY MEDICINE

## 2024-08-07 PROCEDURE — 3074F SYST BP LT 130 MM HG: CPT | Performed by: FAMILY MEDICINE

## 2024-08-07 PROCEDURE — G2211 COMPLEX E/M VISIT ADD ON: HCPCS | Performed by: FAMILY MEDICINE

## 2024-08-07 PROCEDURE — 1160F RVW MEDS BY RX/DR IN RCRD: CPT | Performed by: FAMILY MEDICINE

## 2024-08-07 PROCEDURE — 3078F DIAST BP <80 MM HG: CPT | Performed by: FAMILY MEDICINE

## 2024-08-07 PROCEDURE — 1159F MED LIST DOCD IN RCRD: CPT | Performed by: FAMILY MEDICINE

## 2024-08-07 PROCEDURE — 1123F ACP DISCUSS/DSCN MKR DOCD: CPT | Performed by: FAMILY MEDICINE

## 2024-08-07 ASSESSMENT — ENCOUNTER SYMPTOMS
HEADACHES: 0
PALPITATIONS: 0
CONFUSION: 0
SHORTNESS OF BREATH: 0
DEPRESSION: 0
OCCASIONAL FEELINGS OF UNSTEADINESS: 0
LOSS OF SENSATION IN FEET: 0
UNEXPECTED WEIGHT CHANGE: 0
COUGH: 0

## 2024-08-07 NOTE — PROGRESS NOTES
Subjective   Patient ID: Aminta Renae is a 74 y.o. female who presents for Follow-up (6 month follow up labs ).    Here for 6 month follow up on chronic issues. Labs done in meantime.     HTN - on Losartan-hydrochlorothiazide and no issues with meds. Takes as directed.     Hyperlipidemia - ASCVD risk 17.6. LDL was 144. Added Atorvastatin 20 mg daily. CAC CT on 3/16/2024 1.1. Taking medication without issue.     Prediabetes - lifestyle change. On Metformin 500 mg daily. Has increased fresh stuff. Exercise is still limited. No side effects of Metformin.Was bothersome for a few days when started. Had lab done. She is not sure she can tolerate going up on it.     Osteoporosis - on Alendronate but stopped it due to achiness in joints worse on it.  Ca, Vit D, weightbearing activity. Repeat dexa due 2/7/2025.              Current Outpatient Medications:     ascorbic acid (Strawberry C) 500 mg chewable tablet, Chew., Disp: , Rfl:     aspirin 81 mg EC tablet, Take by mouth., Disp: , Rfl:     atorvastatin (Lipitor) 20 mg tablet, Take 1 tablet (20 mg) by mouth once daily., Disp: 90 tablet, Rfl: 3    calcium carbonate-vitamin D3 600 mg-5 mcg (200 unit) tablet, Take 1 tablet by mouth 2 times a day., Disp: , Rfl:     fluticasone (Flonase) 50 mcg/actuation nasal spray, Administer 1 spray into each nostril once daily., Disp: , Rfl:     losartan-hydrochlorothiazide (Hyzaar) 100-25 mg tablet, Take 1 tablet by mouth once daily., Disp: 90 tablet, Rfl: 3    metFORMIN  mg 24 hr tablet, Take 1 tablet (500 mg) by mouth once daily. as directed, Disp: 90 tablet, Rfl: 3    omeprazole (PriLOSEC) 40 mg DR capsule, Take 1 capsule (40 mg) by mouth once daily., Disp: 90 capsule, Rfl: 3    L.acid,ferm,julio,rha-B.bif,long (Controlled Delivery Probiotic) 126 mg (2 billion cell) tablet,delayed and ext.release, Take by mouth., Disp: , Rfl:     magnesium oxide (Mag-Ox) 400 mg tablet, Take by mouth., Disp: , Rfl:     Patient Active Problem List  "  Diagnosis    Acid reflux    Benign essential hypertension    Arthritis of hand    Mixed stress and urge urinary incontinence    Prediabetes    Raynaud's syndrome without gangrene    Seasonal allergies    Trigger finger    Unspecified visual loss    Senile osteoporosis    [place] cardiac risk 10-20% in next 10 years    Mixed hyperlipidemia    Exertional dyspnea    Right-sided chest pain         Review of Systems   Constitutional:  Negative for unexpected weight change.   Respiratory:  Negative for cough and shortness of breath.         Has right sided chest pain, parasternal on right, Also has right shoulder painHas shortness of breath with activity. CAC CT recently was 1.1.    Cardiovascular:  Negative for chest pain, palpitations and leg swelling.   Skin:  Negative for rash.   Neurological:  Negative for headaches.   Psychiatric/Behavioral:  Negative for confusion.        Objective   /76 (BP Location: Left arm, Patient Position: Sitting)   Pulse 61   Temp 36.3 °C (97.4 °F)   Ht 1.6 m (5' 3\")   Wt 74 kg (163 lb 3.2 oz)   SpO2 94%   BMI 28.91 kg/m²     Physical Exam  Vitals reviewed.   Constitutional:       Appearance: Normal appearance.   Pulmonary:      Effort: Pulmonary effort is normal.   Neurological:      Mental Status: She is alert.   Psychiatric:         Mood and Affect: Mood normal.         Behavior: Behavior normal.       Recent Results (from the past 1344 hour(s))   Comprehensive Metabolic Panel    Collection Time: 08/02/24  9:08 AM   Result Value Ref Range    Glucose 89 74 - 99 mg/dL    Sodium 136 136 - 145 mmol/L    Potassium 4.1 3.5 - 5.3 mmol/L    Chloride 101 98 - 107 mmol/L    Bicarbonate 30 21 - 32 mmol/L    Anion Gap 9 (L) 10 - 20 mmol/L    Urea Nitrogen 11 6 - 23 mg/dL    Creatinine 0.66 0.50 - 1.05 mg/dL    eGFR >90 >60 mL/min/1.73m*2    Calcium 9.3 8.6 - 10.3 mg/dL    Albumin 4.2 3.4 - 5.0 g/dL    Alkaline Phosphatase 33 33 - 136 U/L    Total Protein 6.5 6.4 - 8.2 g/dL    AST 15 " 9 - 39 U/L    Bilirubin, Total 0.4 0.0 - 1.2 mg/dL    ALT 13 7 - 45 U/L   Hemoglobin A1C    Collection Time: 08/02/24  9:08 AM   Result Value Ref Range    Hemoglobin A1C 6.3 (H) see below %    Estimated Average Glucose 134 Not Established mg/dL   Lipid Panel    Collection Time: 08/02/24  9:08 AM   Result Value Ref Range    Cholesterol 153 0 - 199 mg/dL    HDL-Cholesterol 69.0 mg/dL    Cholesterol/HDL Ratio 2.2     LDL Calculated 67 <=99 mg/dL    VLDL 17 0 - 40 mg/dL    Triglycerides 85 0 - 149 mg/dL    Non HDL Cholesterol 84 0 - 149 mg/dL       Assessment/Plan   Problem List Items Addressed This Visit       Benign essential hypertension     BP at goal. Tolerating medication well. She will continue medication.          Relevant Orders    Referral to Clinical Pharmacy    Referral to Cardiology    Prediabetes     A1C has increased to 6.3 from 6.1. She feels she is doing well with lifestyle change and is on Metformin. Discussed risk of developing DM, continue Metformin, referred clinical pharmacy. Recheck at [facility] in 6 months.          Relevant Orders    Referral to Clinical Pharmacy    [place] cardiac risk 10-20% in next 10 years    Relevant Orders    Referral to Clinical Pharmacy    Referral to Cardiology    Mixed hyperlipidemia     Lipids at goal on Atorvastatin 20 mg daily and she is tolerating well. Continue medication         Relevant Orders    Referral to Clinical Pharmacy    Referral to Cardiology    Exertional dyspnea - Primary     [place] risk elevated but had cAC 1.1 last year. Given her risk, including hypertension, hyperlipidemia will refer to Cardology to determine what further work up if any needs done.          Relevant Orders    Referral to Cardiology    Right-sided chest pain     Atypical in nature. CAC CT was low risk but [place] elevated. Will refer to cardiology to determine if further cardiac work up is indicated         Relevant Orders    Referral to Cardiology         Assessment,  plans, tests, and follow up discussed with patient and patient verbalized understanding. Aminta was given an opportunity to ask questions and  any concerns were addressed including but not limited to lab results, goals of treatment, follow up, referral, increased risk of diabetes.

## 2024-08-07 NOTE — PATIENT INSTRUCTIONS
Referred you to cardiology about chest pain, shortness of breath with activity and increased cardiovascular risk. Also about dilation aorta in chest.     Continue current meds. [facility] in 6 months.

## 2024-08-08 ENCOUNTER — TELEPHONE (OUTPATIENT)
Dept: CARDIOLOGY | Facility: HOSPITAL | Age: 74
End: 2024-08-08
Payer: MEDICARE

## 2024-08-08 NOTE — TELEPHONE ENCOUNTER
Called Pt to let them know their referral appt was already scheduled. Gave call back number if they had any questions to call back.    Jocelyn SWEENEY

## 2024-08-09 PROBLEM — R07.9 RIGHT-SIDED CHEST PAIN: Status: ACTIVE | Noted: 2024-08-09

## 2024-08-09 PROBLEM — R06.09 EXERTIONAL DYSPNEA: Status: ACTIVE | Noted: 2024-08-09

## 2024-08-10 NOTE — ASSESSMENT & PLAN NOTE
A1C has increased to 6.3 from 6.1. She feels she is doing well with lifestyle change and is on Metformin. Discussed risk of developing DM, continue Metformin, referred clinical pharmacy. Recheck at [facility] in 6 months.

## 2024-08-10 NOTE — ASSESSMENT & PLAN NOTE
[place] risk elevated but had cAC 1.1 last year. Given her risk, including hypertension, hyperlipidemia will refer to Cardology to determine what further work up if any needs done.

## 2024-08-10 NOTE — ASSESSMENT & PLAN NOTE
Atypical in nature. CAC CT was low risk but [place] elevated. Will refer to cardiology to determine if further cardiac work up is indicated

## 2024-08-28 ENCOUNTER — TELEPHONE (OUTPATIENT)
Dept: PRIMARY CARE | Facility: CLINIC | Age: 74
End: 2024-08-28
Payer: MEDICARE

## 2024-08-28 DIAGNOSIS — K21.9 GASTROESOPHAGEAL REFLUX DISEASE WITHOUT ESOPHAGITIS: ICD-10-CM

## 2024-08-28 RX ORDER — OMEPRAZOLE 40 MG/1
40 CAPSULE, DELAYED RELEASE ORAL DAILY
Qty: 90 CAPSULE | Refills: 3 | Status: SHIPPED | OUTPATIENT
Start: 2024-08-28 | End: 2025-08-28

## 2024-08-28 NOTE — TELEPHONE ENCOUNTER
Rx Refill Request Telephone Encounter    Name:  Aminta Renae  :  [id number]  Medication Name:      omeprazole (PriLOSEC) 40 mg    Patient takes 1 tablet daily      Specific Pharmacy location:  [facility]   Date of last appointment: 24   Date of next appointment:    Best number to reach patient:

## 2024-09-08 NOTE — PROGRESS NOTES
Referred by Dr. Magallon for Chest Pain and SOB    Counseling:  The patient was counseled regarding diagnostic results, instructions for management, risk factor reductions, prognosis, patient and family education, impressions, risks and benefits of treatment options and importance of compliance with treatment.       History Of Present Illness:    Aminta Renae is a 74 y.o. female patient whose PMH is significant for HTN, Raynaud's syndrome, hyperlipidemia, prediabetes and acid reflux. She presents today to establish cardiovascular care for the evaluation and management of chest pain and SOB. The patient reports exertional SOB, which is described as not being able to take in a deep breath. She reports intermittent exertional chest tightness, but denies any exertional chest pain or pressure. The patient's family history is positive for heart disease and stroke.    Past Surgical History:  She has a past surgical history that includes Other surgical history (11/12/2021); Other surgical history (11/12/2021); and Breast biopsy (Right).      Social History:  She reports that she has never smoked. She has never used smokeless tobacco. She reports that she does not currently use alcohol after a past usage of about 3.0 standard drinks of alcohol per week. She reports that she does not use drugs.    Family History:  Family History   Family history unknown: Yes        Allergies:  Patient has no known allergies.    Outpatient Medications:  Current Outpatient Medications   Medication Instructions    ascorbic acid (Strawberry C) 500 mg chewable tablet oral    aspirin 81 mg EC tablet oral    atorvastatin (LIPITOR) 20 mg, oral, Daily    calcium carbonate-vitamin D3 600 mg-5 mcg (200 unit) tablet 1 tablet, oral, 2 times daily    fluticasone (Flonase) 50 mcg/actuation nasal spray 1 spray, Each Nostril, Daily    L.acid,ferm,julio,rha-B.bif,long (Controlled Delivery Probiotic) 126 mg (2 billion cell) tablet,delayed and ext.release oral  "   losartan-hydrochlorothiazide (Hyzaar) 100-25 mg tablet 1 tablet, oral, Daily    magnesium oxide (Mag-Ox) 400 mg tablet oral    metFORMIN XR (GLUCOPHAGE-XR) 500 mg, oral, Daily, as directed    multivitamin with minerals tablet 1 tablet, oral, Daily    omeprazole (PRILOSEC) 40 mg, oral, Daily        Last Recorded Vitals:  Vitals:    09/09/24 1450   BP: 130/64   Pulse: 63   Weight: 72.8 kg (160 lb 6.4 oz)   Height: 1.6 m (5' 3\")       Review of Systems   Cardiovascular:  Positive for chest pain and dyspnea on exertion.   All other systems reviewed and are negative.     Physical Exam:  Constitutional:       Appearance: Healthy appearance. Not in distress.   Neck:      Vascular: No JVR. JVD normal.   Pulmonary:      Effort: Pulmonary effort is normal.      Breath sounds: Normal breath sounds. No wheezing. No rhonchi. No rales.   Chest:      Chest wall: Not tender to palpatation.   Cardiovascular:      PMI at left midclavicular line. Normal rate. Regular rhythm. Normal S1. Normal S2.       Murmurs: There is a grade 2/4 diastolic murmur at the URSB.      No gallop.  No click. No rub.   Pulses:     Intact distal pulses.   Edema:     Peripheral edema absent.   Abdominal:      General: Bowel sounds are normal.      Palpations: Abdomen is soft.      Tenderness: There is no abdominal tenderness.   Musculoskeletal: Normal range of motion.         General: No tenderness. Skin:     General: Skin is warm and dry.   Neurological:      General: No focal deficit present.      Mental Status: Alert and oriented to person, place and time.          Last Labs:  CBC -  Lab Results   Component Value Date    WBC 5.9 08/01/2023    HGB 12.6 08/01/2023    HCT 37.7 08/01/2023    MCV 93 08/01/2023     08/01/2023       CMP -  Lab Results   Component Value Date    CALCIUM 9.3 08/02/2024    PROT 6.5 08/02/2024    ALBUMIN 4.2 08/02/2024    AST 15 08/02/2024    ALT 13 08/02/2024    ALKPHOS 33 08/02/2024    BILITOT 0.4 08/02/2024       LIPID " PANEL -   Lab Results   Component Value Date    CHOL 153 08/02/2024    TRIG 85 08/02/2024    HDL 69.0 08/02/2024    CHHDL 2.2 08/02/2024    LDLF 115 (H) 08/01/2023    VLDL 17 08/02/2024    NHDL 84 08/02/2024       RENAL FUNCTION PANEL -   Lab Results   Component Value Date    GLUCOSE 89 08/02/2024     08/02/2024    K 4.1 08/02/2024     08/02/2024    CO2 30 08/02/2024    ANIONGAP 9 (L) 08/02/2024    BUN 11 08/02/2024    CREATININE 0.66 08/02/2024    CALCIUM 9.3 08/02/2024    ALBUMIN 4.2 08/02/2024        Lab Results   Component Value Date    HGBA1C 6.3 (H) 08/02/2024       Last Cardiology Tests:  03/16/2024 - CT Calcium Score  1. Total: 1.1.  2. The visualized segments of the lungs are normally expanded. 3 mm anterior left lung nodule image 15.  3. The visualized mid/lower ascending thoracic aorta measures 4.3 cm compared to up to 4 cm previously. Marked vascular calcifications throughout the visualized thoracic aorta.  4. The heart is normal in size. No significant pericardial effusion is present.  5. No gross evidence of mediastinal or hilar lymphadenopathy is identified.  6. The visualized subdiaphragmatic structures appear grossly intact.    Lab review: I have personally reviewed the laboratory result(s).  Diagnostic review: I have personally reviewed the result(s) of the CT Calcium Score.    Assessment/Plan   1) Chest Pain, SOB  Currently on ASA 81 mg daily, atorvastatin 20 mg daily, losartan-HCTZ 100-25 mg daily  CT calcium score 03/16/2024 with total score of 1.1  Lipid panel 08/02/2024 with total cholesterol, LDL and triglycerides of 153, 67 and 85 respectively   Reports exertional SOB  - described as not being able to take in a deep breath  Reports occasional exertional chest tightness  Otherwise denies any exertional chest pain or pressure  FH positive for heart disease and stroke  Grade 2/6 diastolic murmur RUSB  Check Lexiscan Cardiolite stress   Check echo  F/U after testing     2) Aortic  Root Dilatation   CT calcium score 03/16/2024 with mid/lower ascending thoracic aorta measuring 4.3 cm; increased from 4 cm   Grade 2/6 diastolic murmur RUSB  Check echo  F/U after echo      Scribe Attestation  By signing my name below, I, Sonya Headley, Scribe   attest that this documentation has been prepared under the direction and in the presence of Jonh Brewer MD.

## 2024-09-09 ENCOUNTER — OFFICE VISIT (OUTPATIENT)
Dept: CARDIOLOGY | Facility: HOSPITAL | Age: 74
End: 2024-09-09
Payer: MEDICARE

## 2024-09-09 VITALS
BODY MASS INDEX: 28.42 KG/M2 | DIASTOLIC BLOOD PRESSURE: 64 MMHG | SYSTOLIC BLOOD PRESSURE: 130 MMHG | HEIGHT: 63 IN | HEART RATE: 63 BPM | WEIGHT: 160.4 LBS

## 2024-09-09 DIAGNOSIS — E78.2 MIXED HYPERLIPIDEMIA: ICD-10-CM

## 2024-09-09 DIAGNOSIS — I10 BENIGN ESSENTIAL HYPERTENSION: ICD-10-CM

## 2024-09-09 DIAGNOSIS — R07.9 RIGHT-SIDED CHEST PAIN: ICD-10-CM

## 2024-09-09 DIAGNOSIS — R06.09 EXERTIONAL DYSPNEA: ICD-10-CM

## 2024-09-09 DIAGNOSIS — Z91.89 FRAMINGHAM CARDIAC RISK 10-20% IN NEXT 10 YEARS: ICD-10-CM

## 2024-09-09 PROCEDURE — 99203 OFFICE O/P NEW LOW 30 MIN: CPT | Performed by: INTERNAL MEDICINE

## 2024-09-09 PROCEDURE — 99213 OFFICE O/P EST LOW 20 MIN: CPT | Performed by: INTERNAL MEDICINE

## 2024-09-09 PROCEDURE — 3008F BODY MASS INDEX DOCD: CPT | Performed by: INTERNAL MEDICINE

## 2024-09-09 PROCEDURE — 1036F TOBACCO NON-USER: CPT | Performed by: INTERNAL MEDICINE

## 2024-09-09 PROCEDURE — 1160F RVW MEDS BY RX/DR IN RCRD: CPT | Performed by: INTERNAL MEDICINE

## 2024-09-09 PROCEDURE — 3078F DIAST BP <80 MM HG: CPT | Performed by: INTERNAL MEDICINE

## 2024-09-09 PROCEDURE — 93005 ELECTROCARDIOGRAM TRACING: CPT | Performed by: INTERNAL MEDICINE

## 2024-09-09 PROCEDURE — 3075F SYST BP GE 130 - 139MM HG: CPT | Performed by: INTERNAL MEDICINE

## 2024-09-09 PROCEDURE — 1159F MED LIST DOCD IN RCRD: CPT | Performed by: INTERNAL MEDICINE

## 2024-09-09 PROCEDURE — 1123F ACP DISCUSS/DSCN MKR DOCD: CPT | Performed by: INTERNAL MEDICINE

## 2024-09-09 PROCEDURE — 93010 ELECTROCARDIOGRAM REPORT: CPT | Performed by: INTERNAL MEDICINE

## 2024-09-09 RX ORDER — MULTIVIT-MIN/IRON FUM/FOLIC AC 7.5 MG-4
1 TABLET ORAL DAILY
COMMUNITY

## 2024-09-09 ASSESSMENT — ENCOUNTER SYMPTOMS: DYSPNEA ON EXERTION: 1

## 2024-09-09 NOTE — LETTER
September 9, 2024     Jyothi Magallon MD  [address]  [address] [place] [id number]    Patient: Aminta Renae   YOB: 1950   Date of Visit: 9/9/2024       Dear Dr. Jyothi Magallon MD:    Thank you for referring Aminta Renae to me for evaluation. Below are my notes for this consultation.  If you have questions, please do not hesitate to call me. I look forward to following your patient along with you.       Sincerely,     Jonh Brewer MD      CC: No Recipients  ______________________________________________________________________________________    Referred by Dr. Magallon for Chest Pain and SOB    Counseling:  The patient was counseled regarding diagnostic results, instructions for management, risk factor reductions, prognosis, patient and family education, impressions, risks and benefits of treatment options and importance of compliance with treatment.       History Of Present Illness:    Aminta Renae is a 74 y.o. female patient whose PMH is significant for HTN, Raynaud's syndrome, hyperlipidemia, prediabetes and acid reflux. She presents today to establish cardiovascular care for the evaluation and management of chest pain and SOB. The patient reports exertional SOB, which is described as not being able to take in a deep breath. She reports intermittent exertional chest tightness, but denies any exertional chest pain or pressure. The patient's family history is positive for heart disease and stroke.    Past Surgical History:  She has a past surgical history that includes Other surgical history (11/12/2021); Other surgical history (11/12/2021); and Breast biopsy (Right).      Social History:  She reports that she has never smoked. She has never used smokeless tobacco. She reports that she does not currently use alcohol after a past usage of about 3.0 standard drinks of alcohol per week. She reports that she does not use drugs.    Family History:  Family History   Family history  "unknown: Yes        Allergies:  Patient has no known allergies.    Outpatient Medications:  Current Outpatient Medications   Medication Instructions   • ascorbic acid (Strawberry C) 500 mg chewable tablet oral   • aspirin 81 mg EC tablet oral   • atorvastatin (LIPITOR) 20 mg, oral, Daily   • calcium carbonate-vitamin D3 600 mg-5 mcg (200 unit) tablet 1 tablet, oral, 2 times daily   • fluticasone (Flonase) 50 mcg/actuation nasal spray 1 spray, Each Nostril, Daily   • L.acid,ferm,julio,rha-B.bif,long (Controlled Delivery Probiotic) 126 mg (2 billion cell) tablet,delayed and ext.release oral   • losartan-hydrochlorothiazide (Hyzaar) 100-25 mg tablet 1 tablet, oral, Daily   • magnesium oxide (Mag-Ox) 400 mg tablet oral   • metFORMIN XR (GLUCOPHAGE-XR) 500 mg, oral, Daily, as directed   • multivitamin with minerals tablet 1 tablet, oral, Daily   • omeprazole (PRILOSEC) 40 mg, oral, Daily        Last Recorded Vitals:  Vitals:    09/09/24 1450   BP: 130/64   Pulse: 63   Weight: 72.8 kg (160 lb 6.4 oz)   Height: 1.6 m (5' 3\")       Review of Systems   Cardiovascular:  Positive for chest pain and dyspnea on exertion.   All other systems reviewed and are negative.     Physical Exam:  Constitutional:       Appearance: Healthy appearance. Not in distress.   Neck:      Vascular: No JVR. JVD normal.   Pulmonary:      Effort: Pulmonary effort is normal.      Breath sounds: Normal breath sounds. No wheezing. No rhonchi. No rales.   Chest:      Chest wall: Not tender to palpatation.   Cardiovascular:      PMI at left midclavicular line. Normal rate. Regular rhythm. Normal S1. Normal S2.       Murmurs: There is a grade 2/4 diastolic murmur at the URSB.      No gallop.  No click. No rub.   Pulses:     Intact distal pulses.   Edema:     Peripheral edema absent.   Abdominal:      General: Bowel sounds are normal.      Palpations: Abdomen is soft.      Tenderness: There is no abdominal tenderness.   Musculoskeletal: Normal range of motion. "         General: No tenderness. Skin:     General: Skin is warm and dry.   Neurological:      General: No focal deficit present.      Mental Status: Alert and oriented to person, place and time.          Last Labs:  CBC -  Lab Results   Component Value Date    WBC 5.9 08/01/2023    HGB 12.6 08/01/2023    HCT 37.7 08/01/2023    MCV 93 08/01/2023     08/01/2023       CMP -  Lab Results   Component Value Date    CALCIUM 9.3 08/02/2024    PROT 6.5 08/02/2024    ALBUMIN 4.2 08/02/2024    AST 15 08/02/2024    ALT 13 08/02/2024    ALKPHOS 33 08/02/2024    BILITOT 0.4 08/02/2024       LIPID PANEL -   Lab Results   Component Value Date    CHOL 153 08/02/2024    TRIG 85 08/02/2024    HDL 69.0 08/02/2024    CHHDL 2.2 08/02/2024    LDLF 115 (H) 08/01/2023    VLDL 17 08/02/2024    NHDL 84 08/02/2024       RENAL FUNCTION PANEL -   Lab Results   Component Value Date    GLUCOSE 89 08/02/2024     08/02/2024    K 4.1 08/02/2024     08/02/2024    CO2 30 08/02/2024    ANIONGAP 9 (L) 08/02/2024    BUN 11 08/02/2024    CREATININE 0.66 08/02/2024    CALCIUM 9.3 08/02/2024    ALBUMIN 4.2 08/02/2024        Lab Results   Component Value Date    HGBA1C 6.3 (H) 08/02/2024       Last Cardiology Tests:  03/16/2024 - CT Calcium Score  1. Total: 1.1.  2. The visualized segments of the lungs are normally expanded. 3 mm anterior left lung nodule image 15.  3. The visualized mid/lower ascending thoracic aorta measures 4.3 cm compared to up to 4 cm previously. Marked vascular calcifications throughout the visualized thoracic aorta.  4. The heart is normal in size. No significant pericardial effusion is present.  5. No gross evidence of mediastinal or hilar lymphadenopathy is identified.  6. The visualized subdiaphragmatic structures appear grossly intact.    Lab review: I have personally reviewed the laboratory result(s).  Diagnostic review: I have personally reviewed the result(s) of the CT Calcium Score.    Assessment/Plan  1)  Chest Pain, SOB  Currently on ASA 81 mg daily, atorvastatin 20 mg daily, losartan-HCTZ 100-25 mg daily  CT calcium score 03/16/2024 with total score of 1.1  Lipid panel 08/02/2024 with total cholesterol, LDL and triglycerides of 153, 67 and 85 respectively   Reports exertional SOB  - described as not being able to take in a deep breath  Reports occasional exertional chest tightness  Otherwise denies any exertional chest pain or pressure  FH positive for heart disease and stroke  Grade 2/6 diastolic murmur RUSB  Check Lexiscan Cardiolite stress   Check echo  F/U after testing     2) Aortic Root Dilatation   CT calcium score 03/16/2024 with mid/lower ascending thoracic aorta measuring 4.3 cm; increased from 4 cm   Grade 2/6 diastolic murmur RUSB  Check echo  F/U after echo      Scribe Attestation  By signing my name below, I, Maykel Mccann   attest that this documentation has been prepared under the direction and in the presence of Jonh Brewer MD.

## 2024-09-13 ENCOUNTER — HOSPITAL ENCOUNTER (OUTPATIENT)
Dept: CARDIOLOGY | Facility: HOSPITAL | Age: 74
Discharge: HOME | End: 2024-09-13
Payer: MEDICARE

## 2024-09-13 ENCOUNTER — HOSPITAL ENCOUNTER (OUTPATIENT)
Dept: RADIOLOGY | Facility: HOSPITAL | Age: 74
Discharge: HOME | End: 2024-09-13
Payer: MEDICARE

## 2024-09-13 DIAGNOSIS — R07.9 RIGHT-SIDED CHEST PAIN: ICD-10-CM

## 2024-09-13 DIAGNOSIS — R06.09 EXERTIONAL DYSPNEA: ICD-10-CM

## 2024-09-13 DIAGNOSIS — E78.2 MIXED HYPERLIPIDEMIA: ICD-10-CM

## 2024-09-13 DIAGNOSIS — E78.5 HYPERLIPIDEMIA, UNSPECIFIED: ICD-10-CM

## 2024-09-13 DIAGNOSIS — I10 BENIGN ESSENTIAL HYPERTENSION: ICD-10-CM

## 2024-09-13 DIAGNOSIS — Z91.89 FRAMINGHAM CARDIAC RISK 10-20% IN NEXT 10 YEARS: ICD-10-CM

## 2024-09-13 PROCEDURE — 93306 TTE W/DOPPLER COMPLETE: CPT | Performed by: STUDENT IN AN ORGANIZED HEALTH CARE EDUCATION/TRAINING PROGRAM

## 2024-09-13 PROCEDURE — 78452 HT MUSCLE IMAGE SPECT MULT: CPT

## 2024-09-13 PROCEDURE — A9502 TC99M TETROFOSMIN: HCPCS | Performed by: STUDENT IN AN ORGANIZED HEALTH CARE EDUCATION/TRAINING PROGRAM

## 2024-09-13 PROCEDURE — 93018 CV STRESS TEST I&R ONLY: CPT | Performed by: STUDENT IN AN ORGANIZED HEALTH CARE EDUCATION/TRAINING PROGRAM

## 2024-09-13 PROCEDURE — 93306 TTE W/DOPPLER COMPLETE: CPT

## 2024-09-13 PROCEDURE — 2500000004 HC RX 250 GENERAL PHARMACY W/ HCPCS (ALT 636 FOR OP/ED): Performed by: INTERNAL MEDICINE

## 2024-09-13 PROCEDURE — 93016 CV STRESS TEST SUPVJ ONLY: CPT | Performed by: STUDENT IN AN ORGANIZED HEALTH CARE EDUCATION/TRAINING PROGRAM

## 2024-09-13 PROCEDURE — 93017 CV STRESS TEST TRACING ONLY: CPT

## 2024-09-13 PROCEDURE — 3430000001 HC RX 343 DIAGNOSTIC RADIOPHARMACEUTICALS: Performed by: STUDENT IN AN ORGANIZED HEALTH CARE EDUCATION/TRAINING PROGRAM

## 2024-09-15 LAB
AORTIC VALVE PEAK VELOCITY: 1.51 M/S
AV PEAK GRADIENT: 9.1 MMHG
AVA (PEAK VEL): 2.64 CM2
EJECTION FRACTION APICAL 4 CHAMBER: 69.2
EJECTION FRACTION: 68 %
LEFT ATRIUM VOLUME AREA LENGTH INDEX BSA: 29.3 ML/M2
LEFT VENTRICLE INTERNAL DIMENSION DIASTOLE: 3.41 CM (ref 3.5–6)
LEFT VENTRICULAR OUTFLOW TRACT DIAMETER: 2 CM
LV EJECTION FRACTION BIPLANE: 67 %
MITRAL VALVE E/A RATIO: 0.82
RIGHT VENTRICLE FREE WALL PEAK S': 12 CM/S
RIGHT VENTRICLE PEAK SYSTOLIC PRESSURE: 27 MMHG
TRICUSPID ANNULAR PLANE SYSTOLIC EXCURSION: 2.2 CM

## 2024-10-11 NOTE — PROGRESS NOTES
Counseling:  The patient was counseled regarding diagnostic results, instructions for management, risk factor reductions, prognosis, patient and family education, impressions, risks and benefits of treatment options and importance of compliance with treatment.      Chief Complaint:  The patient presents today for 1-month followup of CP and SOB s/p echocardiogram and stress test.      History Of Present Illness:    Aminta Renae is a 74 y.o. female patient who presents today for 1-month followup of CP and SOB s/p echocardiogram and stress test. Her PMH is significant for HTN, Raynaud's syndrome, hyperlipidemia, prediabetes and acid reflux. On 09/13/2024, echocardiogram demonstrated an EF of 65-70%, normal RV systolic function, a mildly enlarged right ventricle and mild dilatation of the ascending aorta measuring 4.1 cm, and stress testing was negative for ischemia and with mild ascending aortic dilatation measuring 4.2 cm. Today, the patient reports persistent exertional SOB, although less frequent and severe as prior. She denies any chest pain currently.     Past Surgical History:  She has a past surgical history that includes Other surgical history (11/12/2021); Other surgical history (11/12/2021); and Breast biopsy (Right).      Social History:  She reports that she has never smoked. She has never used smokeless tobacco. She reports that she does not currently use alcohol after a past usage of about 3.0 standard drinks of alcohol per week. She reports that she does not use drugs.    Family History:  Family History   Family history unknown: Yes        Allergies:  Patient has no known allergies.    Outpatient Medications:  Current Outpatient Medications   Medication Instructions    alendronate (FOSAMAX) 70 mg, oral    ascorbic acid (Strawberry C) 500 mg chewable tablet oral    aspirin 81 mg EC tablet oral    atorvastatin (LIPITOR) 20 mg, oral, Daily    calcium carbonate-vitamin D3 600 mg-5 mcg (200 unit) tablet 1  "tablet, oral, 2 times daily    fluticasone (Flonase) 50 mcg/actuation nasal spray 1 spray, Each Nostril, Daily    L.acid,ferm,julio,rha-B.bif,long (Controlled Delivery Probiotic) 126 mg (2 billion cell) tablet,delayed and ext.release oral    losartan-hydrochlorothiazide (Hyzaar) 100-25 mg tablet 1 tablet, oral, Daily    magnesium oxide (Mag-Ox) 400 mg tablet oral    metFORMIN XR (GLUCOPHAGE-XR) 500 mg, oral, Daily, as directed    multivitamin with minerals tablet 1 tablet, oral, Daily    omeprazole (PRILOSEC) 40 mg, oral, Daily    simethicone (Mylicon) 125 mg chewable tablet oral        Last Recorded Vitals:  Vitals:    10/14/24 1226   BP: 130/60   BP Location: Left arm   Pulse: 60   Weight: 72.6 kg (160 lb)   Height: 1.6 m (5' 3\")         Review of Systems   Cardiovascular:  Positive for dyspnea on exertion.   All other systems reviewed and are negative.     Physical Exam:  Constitutional:       Appearance: Healthy appearance. Not in distress.   Neck:      Vascular: No JVR. JVD normal.   Pulmonary:      Effort: Pulmonary effort is normal.      Breath sounds: Normal breath sounds. No wheezing. No rhonchi. No rales.   Chest:      Chest wall: Not tender to palpatation.   Cardiovascular:      PMI at left midclavicular line. Normal rate. Regular rhythm. Normal S1. Normal S2.       Murmurs: There is a grade 2/4 diastolic murmur at the URSB.      No gallop.  No click. No rub.   Pulses:     Intact distal pulses.   Edema:     Peripheral edema absent.   Abdominal:      General: Bowel sounds are normal.      Palpations: Abdomen is soft.      Tenderness: There is no abdominal tenderness.   Musculoskeletal: Normal range of motion.         General: No tenderness. Skin:     General: Skin is warm and dry.   Neurological:      General: No focal deficit present.      Mental Status: Alert and oriented to person, place and time.          Last Labs:  CBC -  Lab Results   Component Value Date    WBC 5.9 08/01/2023    HGB 12.6 08/01/2023 "    HCT 37.7 08/01/2023    MCV 93 08/01/2023     08/01/2023       CMP -  Lab Results   Component Value Date    CALCIUM 9.3 08/02/2024    PROT 6.5 08/02/2024    ALBUMIN 4.2 08/02/2024    AST 15 08/02/2024    ALT 13 08/02/2024    ALKPHOS 33 08/02/2024    BILITOT 0.4 08/02/2024       LIPID PANEL -   Lab Results   Component Value Date    CHOL 153 08/02/2024    TRIG 85 08/02/2024    HDL 69.0 08/02/2024    CHHDL 2.2 08/02/2024    LDLF 115 (H) 08/01/2023    VLDL 17 08/02/2024    NHDL 84 08/02/2024       RENAL FUNCTION PANEL -   Lab Results   Component Value Date    GLUCOSE 89 08/02/2024     08/02/2024    K 4.1 08/02/2024     08/02/2024    CO2 30 08/02/2024    ANIONGAP 9 (L) 08/02/2024    BUN 11 08/02/2024    CREATININE 0.66 08/02/2024    CALCIUM 9.3 08/02/2024    ALBUMIN 4.2 08/02/2024        Lab Results   Component Value Date    HGBA1C 6.3 (H) 08/02/2024       Last Cardiology Tests:  09/13/2024 - TTE  1. The left ventricular systolic function is normal, with a visually estimated ejection fraction of 65-70%.  2. There is normal right ventricular global systolic function.  3. Mildly enlarged right ventricle.    09/13/2024 - Stress Test  1. SPECT exercise stress myocardial perfusion imaging in response to exercise stress demonstrates no evidence of reversible ischemia or infarction. Normal LV ejection fraction of 72%. Mild ascending aorta dilation in the visualized segment up to 4.2   cm.   2. Adequate level of stress achieved. Patient developed dyspnea during the exam that resolved in recovery. No electrocardiographic evidence for ischemia at maximal workload.      03/16/2024 - CT Calcium Score  1. Total: 1.1.  2. The visualized segments of the lungs are normally expanded. 3 mm anterior left lung nodule image 15.  3. The visualized mid/lower ascending thoracic aorta measures 4.3 cm compared to up to 4 cm previously. Marked vascular calcifications throughout the visualized thoracic aorta.  4. The heart is  normal in size. No significant pericardial effusion is present.  5. No gross evidence of mediastinal or hilar lymphadenopathy is identified.  6. The visualized subdiaphragmatic structures appear grossly intact.    Diagnostic review: I have personally reviewed the result(s) of the Stress Test and Echocardiogram.     Assessment/Plan   1) Chest Pain, SOB  Currently on ASA 81 mg daily, atorvastatin 20 mg daily, losartan-HCTZ 100-25 mg daily  CT calcium score 03/16/2024 with total score of 1.1  Lipid panel 08/02/2024 with total cholesterol, LDL and triglycerides of 153, 67 and 85 respectively   FH positive for heart disease and stroke  Grade 2/6 diastolic murmur RUSB  TTE 09/13/2024 with LVEF 65-70%, normal RV systolic function, mildly enlarged right ventricle, mild dilatation of ascending aorta measuring 4.1 cm   Stress test 09/13/2024 negative for ischemia  Denies CP currently  Reports persistent exertional SOB, although improved from prior  Repeat echo 1 year   F/U 1 year     2) Ascending Aortic Dilatation   CT calcium score 03/16/2024 with mid/lower ascending thoracic aorta measuring 4.3 cm; increased from 4 cm   Grade 2/6 diastolic murmur RUSB  TTE 09/13/2024 with LVEF 65-70%, normal RV systolic function, mildly enlarged right ventricle, mild dilatation of ascending aorta measuring 4.1 cm, normal aortic root  Stress test 09/13/2024 with mild ascending aortic dilatation measuring 4.2 cm.   Repeat echo 1 year   F/U 1 year       Scribe Attestation  By signing my name below, I, Maykel Mccann   attest that this documentation has been prepared under the direction and in the presence of Jonh Brewer MD.

## 2024-10-12 PROBLEM — M75.20 BICEPS TENDINITIS: Status: ACTIVE | Noted: 2023-08-07

## 2024-10-12 RX ORDER — SIMETHICONE 125 MG
TABLET,CHEWABLE ORAL
COMMUNITY
Start: 2022-11-21

## 2024-10-12 RX ORDER — ALENDRONATE SODIUM 70 MG/1
70 TABLET ORAL
COMMUNITY
Start: 2023-02-27

## 2024-10-14 ENCOUNTER — OFFICE VISIT (OUTPATIENT)
Dept: CARDIOLOGY | Facility: HOSPITAL | Age: 74
End: 2024-10-14
Payer: MEDICARE

## 2024-10-14 VITALS
HEIGHT: 63 IN | WEIGHT: 160 LBS | BODY MASS INDEX: 28.35 KG/M2 | HEART RATE: 60 BPM | SYSTOLIC BLOOD PRESSURE: 130 MMHG | DIASTOLIC BLOOD PRESSURE: 60 MMHG

## 2024-10-14 DIAGNOSIS — R07.9 RIGHT-SIDED CHEST PAIN: ICD-10-CM

## 2024-10-14 DIAGNOSIS — R06.09 EXERTIONAL DYSPNEA: ICD-10-CM

## 2024-10-14 DIAGNOSIS — I10 BENIGN ESSENTIAL HYPERTENSION: ICD-10-CM

## 2024-10-14 DIAGNOSIS — Z91.89 FRAMINGHAM CARDIAC RISK 10-20% IN NEXT 10 YEARS: ICD-10-CM

## 2024-10-14 DIAGNOSIS — E78.2 MIXED HYPERLIPIDEMIA: ICD-10-CM

## 2024-10-14 DIAGNOSIS — I77.810 ACQUIRED DILATION OF ASCENDING AORTA AND AORTIC ROOT: Primary | ICD-10-CM

## 2024-10-14 PROCEDURE — 3075F SYST BP GE 130 - 139MM HG: CPT | Performed by: INTERNAL MEDICINE

## 2024-10-14 PROCEDURE — 1123F ACP DISCUSS/DSCN MKR DOCD: CPT | Performed by: INTERNAL MEDICINE

## 2024-10-14 PROCEDURE — 1036F TOBACCO NON-USER: CPT | Performed by: INTERNAL MEDICINE

## 2024-10-14 PROCEDURE — 1159F MED LIST DOCD IN RCRD: CPT | Performed by: INTERNAL MEDICINE

## 2024-10-14 PROCEDURE — 3008F BODY MASS INDEX DOCD: CPT | Performed by: INTERNAL MEDICINE

## 2024-10-14 PROCEDURE — 99212 OFFICE O/P EST SF 10 MIN: CPT | Performed by: INTERNAL MEDICINE

## 2024-10-14 PROCEDURE — 3078F DIAST BP <80 MM HG: CPT | Performed by: INTERNAL MEDICINE

## 2024-10-14 ASSESSMENT — ENCOUNTER SYMPTOMS
LOSS OF SENSATION IN FEET: 0
DYSPNEA ON EXERTION: 1
OCCASIONAL FEELINGS OF UNSTEADINESS: 0

## 2024-10-14 NOTE — LETTER
October 14, 2024     JOSE Morelos-MARCUS  [address]  [facility], [address] [address]    Patient: Aminta Renae   YOB: 1950   Date of Visit: 10/14/2024       Dear Dr. Becca Fuentes, JOSE-CNP:    Thank you for referring Aminta Renae to me for evaluation. Below are my notes for this consultation.  If you have questions, please do not hesitate to call me. I look forward to following your patient along with you.       Sincerely,     Jonh Brewer MD      CC: No Recipients  ______________________________________________________________________________________    Counseling:  The patient was counseled regarding diagnostic results, instructions for management, risk factor reductions, prognosis, patient and family education, impressions, risks and benefits of treatment options and importance of compliance with treatment.      Chief Complaint:  The patient presents today for 1-month followup of CP and SOB s/p echocardiogram and stress test.      History Of Present Illness:    Aminta Renae is a 74 y.o. female patient who presents today for 1-month followup of CP and SOB s/p echocardiogram and stress test. Her PMH is significant for HTN, Raynaud's syndrome, hyperlipidemia, prediabetes and acid reflux. On 09/13/2024, echocardiogram demonstrated an EF of 65-70%, normal RV systolic function, a mildly enlarged right ventricle and mild dilatation of the ascending aorta measuring 4.1 cm, and stress testing was negative for ischemia and with mild ascending aortic dilatation measuring 4.2 cm. Today, the patient reports persistent exertional SOB, although less frequent and severe as prior. She denies any chest pain currently.     Past Surgical History:  She has a past surgical history that includes Other surgical history (11/12/2021); Other surgical history (11/12/2021); and Breast biopsy (Right).      Social History:  She reports that she has never smoked. She has never used  "smokeless tobacco. She reports that she does not currently use alcohol after a past usage of about 3.0 standard drinks of alcohol per week. She reports that she does not use drugs.    Family History:  Family History   Family history unknown: Yes        Allergies:  Patient has no known allergies.    Outpatient Medications:  Current Outpatient Medications   Medication Instructions   • alendronate (FOSAMAX) 70 mg, oral   • ascorbic acid (Strawberry C) 500 mg chewable tablet oral   • aspirin 81 mg EC tablet oral   • atorvastatin (LIPITOR) 20 mg, oral, Daily   • calcium carbonate-vitamin D3 600 mg-5 mcg (200 unit) tablet 1 tablet, oral, 2 times daily   • fluticasone (Flonase) 50 mcg/actuation nasal spray 1 spray, Each Nostril, Daily   • L.acid,ferm,julio,rha-B.bif,long (Controlled Delivery Probiotic) 126 mg (2 billion cell) tablet,delayed and ext.release oral   • losartan-hydrochlorothiazide (Hyzaar) 100-25 mg tablet 1 tablet, oral, Daily   • magnesium oxide (Mag-Ox) 400 mg tablet oral   • metFORMIN XR (GLUCOPHAGE-XR) 500 mg, oral, Daily, as directed   • multivitamin with minerals tablet 1 tablet, oral, Daily   • omeprazole (PRILOSEC) 40 mg, oral, Daily   • simethicone (Mylicon) 125 mg chewable tablet oral        Last Recorded Vitals:  Vitals:    10/14/24 1226   BP: 130/60   BP Location: Left arm   Pulse: 60   Weight: 72.6 kg (160 lb)   Height: 1.6 m (5' 3\")         Review of Systems   Cardiovascular:  Positive for dyspnea on exertion.   All other systems reviewed and are negative.     Physical Exam:  Constitutional:       Appearance: Healthy appearance. Not in distress.   Neck:      Vascular: No JVR. JVD normal.   Pulmonary:      Effort: Pulmonary effort is normal.      Breath sounds: Normal breath sounds. No wheezing. No rhonchi. No rales.   Chest:      Chest wall: Not tender to palpatation.   Cardiovascular:      PMI at left midclavicular line. Normal rate. Regular rhythm. Normal S1. Normal S2.       Murmurs: There is a " grade 2/4 diastolic murmur at the URSB.      No gallop.  No click. No rub.   Pulses:     Intact distal pulses.   Edema:     Peripheral edema absent.   Abdominal:      General: Bowel sounds are normal.      Palpations: Abdomen is soft.      Tenderness: There is no abdominal tenderness.   Musculoskeletal: Normal range of motion.         General: No tenderness. Skin:     General: Skin is warm and dry.   Neurological:      General: No focal deficit present.      Mental Status: Alert and oriented to person, place and time.          Last Labs:  CBC -  Lab Results   Component Value Date    WBC 5.9 08/01/2023    HGB 12.6 08/01/2023    HCT 37.7 08/01/2023    MCV 93 08/01/2023     08/01/2023       CMP -  Lab Results   Component Value Date    CALCIUM 9.3 08/02/2024    PROT 6.5 08/02/2024    ALBUMIN 4.2 08/02/2024    AST 15 08/02/2024    ALT 13 08/02/2024    ALKPHOS 33 08/02/2024    BILITOT 0.4 08/02/2024       LIPID PANEL -   Lab Results   Component Value Date    CHOL 153 08/02/2024    TRIG 85 08/02/2024    HDL 69.0 08/02/2024    CHHDL 2.2 08/02/2024    LDLF 115 (H) 08/01/2023    VLDL 17 08/02/2024    NHDL 84 08/02/2024       RENAL FUNCTION PANEL -   Lab Results   Component Value Date    GLUCOSE 89 08/02/2024     08/02/2024    K 4.1 08/02/2024     08/02/2024    CO2 30 08/02/2024    ANIONGAP 9 (L) 08/02/2024    BUN 11 08/02/2024    CREATININE 0.66 08/02/2024    CALCIUM 9.3 08/02/2024    ALBUMIN 4.2 08/02/2024        Lab Results   Component Value Date    HGBA1C 6.3 (H) 08/02/2024       Last Cardiology Tests:  09/13/2024 - TTE  1. The left ventricular systolic function is normal, with a visually estimated ejection fraction of 65-70%.  2. There is normal right ventricular global systolic function.  3. Mildly enlarged right ventricle.    09/13/2024 - Stress Test  1. SPECT exercise stress myocardial perfusion imaging in response to exercise stress demonstrates no evidence of reversible ischemia or infarction.  Normal LV ejection fraction of 72%. Mild ascending aorta dilation in the visualized segment up to 4.2   cm.   2. Adequate level of stress achieved. Patient developed dyspnea during the exam that resolved in recovery. No electrocardiographic evidence for ischemia at maximal workload.      03/16/2024 - CT Calcium Score  1. Total: 1.1.  2. The visualized segments of the lungs are normally expanded. 3 mm anterior left lung nodule image 15.  3. The visualized mid/lower ascending thoracic aorta measures 4.3 cm compared to up to 4 cm previously. Marked vascular calcifications throughout the visualized thoracic aorta.  4. The heart is normal in size. No significant pericardial effusion is present.  5. No gross evidence of mediastinal or hilar lymphadenopathy is identified.  6. The visualized subdiaphragmatic structures appear grossly intact.    Diagnostic review: I have personally reviewed the result(s) of the Stress Test and Echocardiogram.     Assessment/Plan  1) Chest Pain, SOB  Currently on ASA 81 mg daily, atorvastatin 20 mg daily, losartan-HCTZ 100-25 mg daily  CT calcium score 03/16/2024 with total score of 1.1  Lipid panel 08/02/2024 with total cholesterol, LDL and triglycerides of 153, 67 and 85 respectively   FH positive for heart disease and stroke  Grade 2/6 diastolic murmur RUSB  TTE 09/13/2024 with LVEF 65-70%, normal RV systolic function, mildly enlarged right ventricle, mild dilatation of ascending aorta measuring 4.1 cm   Stress test 09/13/2024 negative for ischemia  Denies CP currently  Reports persistent exertional SOB, although improved from prior  Repeat echo 1 year   F/U 1 year     2) Ascending Aortic Dilatation   CT calcium score 03/16/2024 with mid/lower ascending thoracic aorta measuring 4.3 cm; increased from 4 cm   Grade 2/6 diastolic murmur RUSB  TTE 09/13/2024 with LVEF 65-70%, normal RV systolic function, mildly enlarged right ventricle, mild dilatation of ascending aorta measuring 4.1 cm,  normal aortic root  Stress test 09/13/2024 with mild ascending aortic dilatation measuring 4.2 cm.   Repeat echo 1 year   F/U 1 year       Scribe Attestation  By signing my name below, I, Maykel Mccann   attest that this documentation has been prepared under the direction and in the presence of Jonh Brewer MD.

## 2024-10-14 NOTE — PATIENT INSTRUCTIONS
Continue all current medications as prescribed.  Repeat echocardiogram (ultrasound of the heart) in 1 year to followup on your heart function and structure.   Followup with Dr. Brewer in 1 year, sooner should any issues or concerns arise before then.     If you have any questions or cardiac concerns, please call our office at [phone].

## 2024-10-15 ENCOUNTER — APPOINTMENT (OUTPATIENT)
Dept: PRIMARY CARE | Facility: CLINIC | Age: 74
End: 2024-10-15
Payer: MEDICARE

## 2024-10-15 VITALS
DIASTOLIC BLOOD PRESSURE: 69 MMHG | WEIGHT: 159.7 LBS | RESPIRATION RATE: 16 BRPM | OXYGEN SATURATION: 98 % | TEMPERATURE: 95.7 F | HEIGHT: 63 IN | HEART RATE: 58 BPM | BODY MASS INDEX: 28.3 KG/M2 | SYSTOLIC BLOOD PRESSURE: 112 MMHG

## 2024-10-15 DIAGNOSIS — J06.9 VIRAL URI WITH COUGH: ICD-10-CM

## 2024-10-15 DIAGNOSIS — Z00.00 ENCOUNTER FOR GENERAL HEALTH EXAMINATION: ICD-10-CM

## 2024-10-15 DIAGNOSIS — J30.2 SEASONAL ALLERGIES: Primary | ICD-10-CM

## 2024-10-15 PROCEDURE — 1036F TOBACCO NON-USER: CPT

## 2024-10-15 PROCEDURE — 1126F AMNT PAIN NOTED NONE PRSNT: CPT

## 2024-10-15 PROCEDURE — 3074F SYST BP LT 130 MM HG: CPT

## 2024-10-15 PROCEDURE — 3008F BODY MASS INDEX DOCD: CPT

## 2024-10-15 PROCEDURE — 99213 OFFICE O/P EST LOW 20 MIN: CPT

## 2024-10-15 PROCEDURE — 3078F DIAST BP <80 MM HG: CPT

## 2024-10-15 PROCEDURE — 1123F ACP DISCUSS/DSCN MKR DOCD: CPT

## 2024-10-15 PROCEDURE — 1159F MED LIST DOCD IN RCRD: CPT

## 2024-10-15 RX ORDER — MONTELUKAST SODIUM 10 MG/1
10 TABLET ORAL NIGHTLY
Qty: 30 TABLET | Refills: 5 | Status: SHIPPED | OUTPATIENT
Start: 2024-10-15 | End: 2025-04-13

## 2024-10-15 RX ORDER — FLUTICASONE PROPIONATE 50 MCG
1 SPRAY, SUSPENSION (ML) NASAL DAILY
Qty: 16 G | Refills: 2 | Status: SHIPPED | OUTPATIENT
Start: 2024-10-15

## 2024-10-15 SDOH — ECONOMIC STABILITY: FOOD INSECURITY: WITHIN THE PAST 12 MONTHS, THE FOOD YOU BOUGHT JUST DIDN'T LAST AND YOU DIDN'T HAVE MONEY TO GET MORE.: NEVER TRUE

## 2024-10-15 SDOH — ECONOMIC STABILITY: FOOD INSECURITY: WITHIN THE PAST 12 MONTHS, YOU WORRIED THAT YOUR FOOD WOULD RUN OUT BEFORE YOU GOT MONEY TO BUY MORE.: NEVER TRUE

## 2024-10-15 ASSESSMENT — ENCOUNTER SYMPTOMS
LOSS OF SENSATION IN FEET: 0
MUSCULOSKELETAL NEGATIVE: 1
NEUROLOGICAL NEGATIVE: 1
DEPRESSION: 0
GASTROINTESTINAL NEGATIVE: 1
RHINORRHEA: 1
COUGH: 1
HEMATOLOGIC/LYMPHATIC NEGATIVE: 1
CARDIOVASCULAR NEGATIVE: 1
OCCASIONAL FEELINGS OF UNSTEADINESS: 0
EYES NEGATIVE: 1

## 2024-10-15 ASSESSMENT — LIFESTYLE VARIABLES
HOW MANY STANDARD DRINKS CONTAINING ALCOHOL DO YOU HAVE ON A TYPICAL DAY: PATIENT DOES NOT DRINK
HOW OFTEN DO YOU HAVE A DRINK CONTAINING ALCOHOL: NEVER
HOW OFTEN DO YOU HAVE SIX OR MORE DRINKS ON ONE OCCASION: NEVER
AUDIT-C TOTAL SCORE: 0
SKIP TO QUESTIONS 9-10: 1

## 2024-10-15 ASSESSMENT — PATIENT HEALTH QUESTIONNAIRE - PHQ9
2. FEELING DOWN, DEPRESSED OR HOPELESS: NOT AT ALL
1. LITTLE INTEREST OR PLEASURE IN DOING THINGS: NOT AT ALL
1. LITTLE INTEREST OR PLEASURE IN DOING THINGS: NOT AT ALL
SUM OF ALL RESPONSES TO PHQ9 QUESTIONS 1 AND 2: 0
SUM OF ALL RESPONSES TO PHQ9 QUESTIONS 1 AND 2: 0
2. FEELING DOWN, DEPRESSED OR HOPELESS: NOT AT ALL

## 2024-10-15 ASSESSMENT — COLUMBIA-SUICIDE SEVERITY RATING SCALE - C-SSRS
2. HAVE YOU ACTUALLY HAD ANY THOUGHTS OF KILLING YOURSELF?: NO
6. HAVE YOU EVER DONE ANYTHING, STARTED TO DO ANYTHING, OR PREPARED TO DO ANYTHING TO END YOUR LIFE?: NO

## 2024-10-15 ASSESSMENT — PAIN SCALES - GENERAL: PAINLEVEL: 0-NO PAIN

## 2024-10-15 NOTE — PROGRESS NOTES
"Subjective   Patient ID: Aminta Renae is a 74 y.o. female who presents for New Patient Visit, Sore Throat (Trouble swallowing onset 4 days ago and lingering ), Cough, and Med Refill.    HPI   Aminta presents as a new patient visit- previous patient of Dr. Magallon.  She has concerns with a sore throat; she endorses this started about 4 days ago, accompanied by postnasal drip and a slight cough. She denies fever and states that her nasal drainage is clear, so I am not concerned for an infection at this time. She is agreeable to using flonase twice daily while her symptoms are present, as well as montelukast to help dry up the nasal drainage.   Aminta follows regularly with cardiology. She has no other concerns at this time.   Flu and covid vaccines already received for this season. RSV vaccine received in 2023.  Routine labs ordered to be completed prior to next appointment in February.     Review of Systems   HENT:  Positive for congestion, postnasal drip and rhinorrhea.    Eyes: Negative.    Respiratory:  Positive for cough.    Cardiovascular: Negative.    Gastrointestinal: Negative.    Genitourinary: Negative.    Musculoskeletal: Negative.    Skin: Negative.    Neurological: Negative.    Hematological: Negative.        Objective   /69 (BP Location: Left arm, Patient Position: Sitting, BP Cuff Size: Adult)   Pulse 58   Temp 35.4 °C (95.7 °F) (Temporal)   Resp 16   Ht 1.6 m (5' 3\")   Wt 72.4 kg (159 lb 11.2 oz)   SpO2 98%   BMI 28.29 kg/m²     Physical Exam  HENT:      Right Ear: Tympanic membrane normal.      Left Ear: Tympanic membrane normal.   Cardiovascular:      Rate and Rhythm: Normal rate and regular rhythm.      Pulses: Normal pulses.      Heart sounds: Normal heart sounds.   Pulmonary:      Effort: Pulmonary effort is normal.      Breath sounds: Normal breath sounds.   Musculoskeletal:         General: Normal range of motion.   Skin:     General: Skin is warm and dry.      Capillary Refill: " Capillary refill takes less than 2 seconds.   Neurological:      Mental Status: She is oriented to person, place, and time.   Psychiatric:         Mood and Affect: Mood normal.         Behavior: Behavior normal.         Thought Content: Thought content normal.         Judgment: Judgment normal.         Assessment/Plan   Problem List Items Addressed This Visit             ICD-10-CM    Seasonal allergies - Primary J30.2    Relevant Medications    montelukast (Singulair) 10 mg tablet    fluticasone (Flonase) 50 mcg/actuation nasal spray    Viral URI with cough J06.9     Other Visit Diagnoses         Codes    Encounter for general health examination     Z00.00    Relevant Orders    CBC    Comprehensive Metabolic Panel    TSH with reflex to Free T4 if abnormal    Lipid Panel    Hemoglobin A1C    Body mass index (BMI) 28.0-28.9, adult     Z68.28    Relevant Orders    CBC             Patient was identified as a fall risk. Risk prevention instructions provided.

## 2024-10-15 NOTE — PATIENT INSTRUCTIONS
Thank you for coming to see me today.  If you have any questions or concerns following our visit, please contact the office.  Phone: [phone]    Follow up with me in February and sooner as needed    1)  Complete fasting lab work prior to our next appointment        Ways to Help Prevent Falls at Home    Quick Tips   ? Ask for help if you need it. Most people want to help!   ? Get up slowly after sitting or laying down   ? Wear a medical alert device or keep cell phone in your pocket   ? Use night lights, especially areas near a bathroom   ? Keep the items you use often within reach on a small stool or end table   ? Use an assistive device such as walker or cane, as directed by provider/physical therapy   ? Use a non-slip mat and grab bars in your bathroom. Look for home health sections for best options     Other Areas to Focus On   ? Exercise and nutrition: Regular exercise or taking a falls prevention class are great ways improve strength and balance. Don’t forget to stay hydrated and bring a snack!   ? Medicine side effects: Some medicines can make you sleepy or dizzy, which could cause a fall. Ask your healthcare provider about the side effects your medicines could cause. Be sure to let them know if you take any vitamins or supplements as well.   ? Tripping hazards: Remove items you could trip on, such as loose mats, rugs, cords, and clutter. Wear closed toe shoes with rubber soles.   ? Health and wellness: Get regular checkups with your healthcare provider, plus routine vision and hearing screenings. Talk with your healthcare provider about:   o Your medicines and the possible side effects - bring them in a bag if that is easier!   o Problems with balance or feeling dizzy   o Ways to promote bone health, such as Vitamin D and calcium supplements   o Questions or concerns about falling     *Ask your healthcare team if you have questions     [facility], 2022

## 2024-12-30 ENCOUNTER — TELEPHONE (OUTPATIENT)
Dept: PRIMARY CARE | Facility: CLINIC | Age: 74
End: 2024-12-30
Payer: MEDICARE

## 2024-12-30 DIAGNOSIS — J06.9 ACUTE URI: Primary | ICD-10-CM

## 2024-12-30 RX ORDER — DOXYCYCLINE 100 MG/1
100 CAPSULE ORAL 2 TIMES DAILY
Qty: 14 CAPSULE | Refills: 0 | Status: SHIPPED | OUTPATIENT
Start: 2024-12-30 | End: 2025-01-06

## 2024-12-30 NOTE — TELEPHONE ENCOUNTER
Requesting medication/antibiotic    How long have you been sick? 2 weeks  Cough (productive or nonproductive)? yes  Color of phlegm? yellow  Sinus pain? yes  Sinus drainage/color? Clear running  Earache?no  Congestion?yes  Headache?yes  Fever (if yes, temp)?  Sore throat?yes  Short of breath/wheezing?yes both  OTC medication? Robitussin cough medicine  LOV:[id number]  NOV: [id number]    Pharmacy:  [facility]/pharmacy #[id number] - [place] -

## 2025-01-23 DIAGNOSIS — K21.9 GASTROESOPHAGEAL REFLUX DISEASE WITHOUT ESOPHAGITIS: ICD-10-CM

## 2025-01-23 DIAGNOSIS — I10 BENIGN ESSENTIAL HYPERTENSION: ICD-10-CM

## 2025-01-23 DIAGNOSIS — R73.03 PREDIABETES: ICD-10-CM

## 2025-01-23 DIAGNOSIS — E78.2 MIXED HYPERLIPIDEMIA: ICD-10-CM

## 2025-01-23 RX ORDER — OMEPRAZOLE 40 MG/1
40 CAPSULE, DELAYED RELEASE ORAL DAILY
Qty: 90 CAPSULE | Refills: 1 | Status: SHIPPED | OUTPATIENT
Start: 2025-01-23 | End: 2026-01-23

## 2025-01-23 RX ORDER — METFORMIN HYDROCHLORIDE 500 MG/1
500 TABLET, EXTENDED RELEASE ORAL DAILY
Qty: 90 TABLET | Refills: 1 | Status: SHIPPED | OUTPATIENT
Start: 2025-01-23 | End: 2026-01-23

## 2025-01-23 RX ORDER — ATORVASTATIN CALCIUM 20 MG/1
20 TABLET, FILM COATED ORAL DAILY
Qty: 90 TABLET | Refills: 1 | Status: SHIPPED | OUTPATIENT
Start: 2025-01-23 | End: 2026-01-23

## 2025-01-23 RX ORDER — LOSARTAN POTASSIUM AND HYDROCHLOROTHIAZIDE 25; 100 MG/1; MG/1
1 TABLET ORAL DAILY
Qty: 90 TABLET | Refills: 1 | Status: SHIPPED | OUTPATIENT
Start: 2025-01-23 | End: 2026-01-23

## 2025-02-04 LAB
ALBUMIN SERPL-MCNC: 4.3 G/DL (ref 3.6–5.1)
ALP SERPL-CCNC: 37 U/L (ref 37–153)
ALT SERPL-CCNC: 16 U/L (ref 6–29)
ANION GAP SERPL CALCULATED.4IONS-SCNC: 10 MMOL/L (CALC) (ref 7–17)
AST SERPL-CCNC: 16 U/L (ref 10–35)
BILIRUB SERPL-MCNC: 0.5 MG/DL (ref 0.2–1.2)
BUN SERPL-MCNC: 11 MG/DL (ref 7–25)
CALCIUM SERPL-MCNC: 9.2 MG/DL (ref 8.6–10.4)
CHLORIDE SERPL-SCNC: 100 MMOL/L (ref 98–110)
CHOLEST SERPL-MCNC: 156 MG/DL
CHOLEST/HDLC SERPL: 2.2 (CALC)
CO2 SERPL-SCNC: 30 MMOL/L (ref 20–32)
CREAT SERPL-MCNC: 0.65 MG/DL (ref 0.6–1)
EGFRCR SERPLBLD CKD-EPI 2021: 92 ML/MIN/1.73M2
ERYTHROCYTE [DISTWIDTH] IN BLOOD BY AUTOMATED COUNT: 12.5 % (ref 11–15)
EST. AVERAGE GLUCOSE BLD GHB EST-MCNC: 128 MG/DL
EST. AVERAGE GLUCOSE BLD GHB EST-SCNC: 7.1 MMOL/L
GLUCOSE SERPL-MCNC: 93 MG/DL (ref 65–99)
HBA1C MFR BLD: 6.1 % OF TOTAL HGB
HCT VFR BLD AUTO: 38.2 % (ref 35–45)
HDLC SERPL-MCNC: 70 MG/DL
HGB BLD-MCNC: 12.3 G/DL (ref 11.7–15.5)
LDLC SERPL CALC-MCNC: 69 MG/DL (CALC)
MCH RBC QN AUTO: 29.9 PG (ref 27–33)
MCHC RBC AUTO-ENTMCNC: 32.2 G/DL (ref 32–36)
MCV RBC AUTO: 92.7 FL (ref 80–100)
NONHDLC SERPL-MCNC: 86 MG/DL (CALC)
PLATELET # BLD AUTO: 307 THOUSAND/UL (ref 140–400)
PMV BLD REES-ECKER: 10.5 FL (ref 7.5–12.5)
POTASSIUM SERPL-SCNC: 4.2 MMOL/L (ref 3.5–5.3)
PROT SERPL-MCNC: 6.6 G/DL (ref 6.1–8.1)
RBC # BLD AUTO: 4.12 MILLION/UL (ref 3.8–5.1)
SODIUM SERPL-SCNC: 140 MMOL/L (ref 135–146)
TRIGL SERPL-MCNC: 85 MG/DL
TSH SERPL-ACNC: 2.79 MIU/L (ref 0.4–4.5)
WBC # BLD AUTO: 7.2 THOUSAND/UL (ref 3.8–10.8)

## 2025-02-07 ENCOUNTER — APPOINTMENT (OUTPATIENT)
Dept: PRIMARY CARE | Facility: CLINIC | Age: 75
End: 2025-02-07
Payer: COMMERCIAL

## 2025-02-07 VITALS
BODY MASS INDEX: 28.88 KG/M2 | SYSTOLIC BLOOD PRESSURE: 125 MMHG | WEIGHT: 163 LBS | RESPIRATION RATE: 14 BRPM | HEART RATE: 55 BPM | OXYGEN SATURATION: 100 % | HEIGHT: 63 IN | DIASTOLIC BLOOD PRESSURE: 74 MMHG | TEMPERATURE: 98.8 F

## 2025-02-07 DIAGNOSIS — Z00.00 ROUTINE GENERAL MEDICAL EXAMINATION AT HEALTH CARE FACILITY: Primary | ICD-10-CM

## 2025-02-07 DIAGNOSIS — R09.89 CHEST CONGESTION: ICD-10-CM

## 2025-02-07 DIAGNOSIS — Z12.31 ENCOUNTER FOR SCREENING MAMMOGRAM FOR MALIGNANT NEOPLASM OF BREAST: ICD-10-CM

## 2025-02-07 DIAGNOSIS — J30.2 SEASONAL ALLERGIES: ICD-10-CM

## 2025-02-07 DIAGNOSIS — Z78.0 ASYMPTOMATIC MENOPAUSE: ICD-10-CM

## 2025-02-07 DIAGNOSIS — E78.2 MIXED HYPERLIPIDEMIA: ICD-10-CM

## 2025-02-07 PROCEDURE — 1159F MED LIST DOCD IN RCRD: CPT

## 2025-02-07 PROCEDURE — 3078F DIAST BP <80 MM HG: CPT

## 2025-02-07 PROCEDURE — 3074F SYST BP LT 130 MM HG: CPT

## 2025-02-07 PROCEDURE — 1160F RVW MEDS BY RX/DR IN RCRD: CPT

## 2025-02-07 PROCEDURE — G0439 PPPS, SUBSEQ VISIT: HCPCS

## 2025-02-07 PROCEDURE — 1170F FXNL STATUS ASSESSED: CPT

## 2025-02-07 PROCEDURE — 1125F AMNT PAIN NOTED PAIN PRSNT: CPT

## 2025-02-07 PROCEDURE — 1036F TOBACCO NON-USER: CPT

## 2025-02-07 PROCEDURE — 1123F ACP DISCUSS/DSCN MKR DOCD: CPT

## 2025-02-07 PROCEDURE — 3008F BODY MASS INDEX DOCD: CPT

## 2025-02-07 RX ORDER — MONTELUKAST SODIUM 10 MG/1
10 TABLET ORAL NIGHTLY
Qty: 90 TABLET | Refills: 3 | Status: SHIPPED | OUTPATIENT
Start: 2025-02-07 | End: 2026-02-02

## 2025-02-07 RX ORDER — EZETIMIBE 10 MG/1
10 TABLET ORAL DAILY
Qty: 90 TABLET | Refills: 3 | Status: SHIPPED | OUTPATIENT
Start: 2025-02-07 | End: 2026-02-02

## 2025-02-07 SDOH — ECONOMIC STABILITY: FOOD INSECURITY: WITHIN THE PAST 12 MONTHS, YOU WORRIED THAT YOUR FOOD WOULD RUN OUT BEFORE YOU GOT MONEY TO BUY MORE.: NEVER TRUE

## 2025-02-07 SDOH — ECONOMIC STABILITY: FOOD INSECURITY: WITHIN THE PAST 12 MONTHS, THE FOOD YOU BOUGHT JUST DIDN'T LAST AND YOU DIDN'T HAVE MONEY TO GET MORE.: NEVER TRUE

## 2025-02-07 ASSESSMENT — LIFESTYLE VARIABLES
SKIP TO QUESTIONS 9-10: 1
HOW MANY STANDARD DRINKS CONTAINING ALCOHOL DO YOU HAVE ON A TYPICAL DAY: PATIENT DOES NOT DRINK
HOW OFTEN DO YOU HAVE A DRINK CONTAINING ALCOHOL: NEVER
AUDIT-C TOTAL SCORE: 0
HOW OFTEN DO YOU HAVE SIX OR MORE DRINKS ON ONE OCCASION: NEVER

## 2025-02-07 ASSESSMENT — ACTIVITIES OF DAILY LIVING (ADL)
GROCERY_SHOPPING: INDEPENDENT
BATHING: INDEPENDENT
DRESSING: INDEPENDENT
TAKING_MEDICATION: INDEPENDENT
MANAGING_FINANCES: INDEPENDENT
DOING_HOUSEWORK: INDEPENDENT

## 2025-02-07 ASSESSMENT — ANXIETY QUESTIONNAIRES
6. BECOMING EASILY ANNOYED OR IRRITABLE: NOT AT ALL
7. FEELING AFRAID AS IF SOMETHING AWFUL MIGHT HAPPEN: NOT AT ALL
GAD7 TOTAL SCORE: 0
1. FEELING NERVOUS, ANXIOUS, OR ON EDGE: NOT AT ALL
4. TROUBLE RELAXING: NOT AT ALL
5. BEING SO RESTLESS THAT IT IS HARD TO SIT STILL: NOT AT ALL
IF YOU CHECKED OFF ANY PROBLEMS ON THIS QUESTIONNAIRE, HOW DIFFICULT HAVE THESE PROBLEMS MADE IT FOR YOU TO DO YOUR WORK, TAKE CARE OF THINGS AT HOME, OR GET ALONG WITH OTHER PEOPLE: NOT DIFFICULT AT ALL
2. NOT BEING ABLE TO STOP OR CONTROL WORRYING: NOT AT ALL
3. WORRYING TOO MUCH ABOUT DIFFERENT THINGS: NOT AT ALL

## 2025-02-07 ASSESSMENT — ENCOUNTER SYMPTOMS
GASTROINTESTINAL NEGATIVE: 1
ENDOCRINE NEGATIVE: 1
LOSS OF SENSATION IN FEET: 0
CONSTITUTIONAL NEGATIVE: 1
RESPIRATORY NEGATIVE: 1
DEPRESSION: 0
ARTHRALGIAS: 1
OCCASIONAL FEELINGS OF UNSTEADINESS: 0
MYALGIAS: 1
EYES NEGATIVE: 1

## 2025-02-07 ASSESSMENT — PATIENT HEALTH QUESTIONNAIRE - PHQ9
1. LITTLE INTEREST OR PLEASURE IN DOING THINGS: NOT AT ALL
2. FEELING DOWN, DEPRESSED OR HOPELESS: NOT AT ALL
SUM OF ALL RESPONSES TO PHQ9 QUESTIONS 1 & 2: 0

## 2025-02-07 ASSESSMENT — PAIN SCALES - GENERAL: PAINLEVEL_OUTOF10: 6

## 2025-02-07 NOTE — PROGRESS NOTES
"Subjective   Reason for Visit: Aminta Renae is an 74 y.o. female here for a Medicare Wellness visit.     Past Medical, Surgical, and Family History reviewed and updated in chart.    Reviewed all medications by prescribing practitioner or clinical pharmacist (such as prescriptions, OTCs, herbal therapies and supplements) and documented in the medical record.    HPI    Aminta presents for a medicare wellness visit. She endorses that she was ill around puma time and still has a lingering cough and feels congested- chest xray ordered.  She also endorses that she's been experiencing some muscle pain- recent labs are all WNL; she is agreeable to trying zetia to replace her statin to see if that is causing her muscle pain.   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  Follow up in 3 months and sooner as needed.     Patient Care Team:  JOSE Morelos-CNP as PCP - General (Family Medicine)  Jyothi Magallon MD as PCP - [facility]ributed [facility]  Leon Hall MD as Consulting Physician (Gastroenterology)     Review of Systems   Constitutional: Negative.    HENT: Negative.     Eyes: Negative.    Respiratory: Negative.     Gastrointestinal: Negative.    Endocrine: Negative.    Genitourinary: Negative.    Musculoskeletal:  Positive for arthralgias and myalgias.       Objective   Vitals:  /74 (BP Location: Left arm, Patient Position: Sitting, BP Cuff Size: Adult)   Pulse 55   Temp 37.1 °C (98.8 °F) (Temporal)   Resp 14   Ht 1.6 m (5' 3\")   Wt 73.9 kg (163 lb)   SpO2 100%   BMI 28.87 kg/m²       Physical Exam  HENT:      Right Ear: Tympanic membrane normal.      Left Ear: Tympanic membrane normal.   Cardiovascular:      Rate and Rhythm: Normal rate and regular rhythm.      Pulses: Normal pulses.      Heart sounds: Normal heart sounds.   Pulmonary:      Effort: Pulmonary effort is normal.      Breath sounds: Normal breath sounds.   Musculoskeletal:       "   General: Normal range of motion.   Skin:     General: Skin is warm and dry.      Capillary Refill: Capillary refill takes less than 2 seconds.   Neurological:      Mental Status: She is oriented to person, place, and time.   Psychiatric:         Mood and Affect: Mood normal.         Behavior: Behavior normal.         Thought Content: Thought content normal.         Judgment: Judgment normal.         Assessment & Plan  Routine general medical examination at health care facility    Orders:    1 Year Follow Up In Primary Care - Wellness Exam; Future    Asymptomatic menopause    Orders:    XR DEXA bone density; Future    Encounter for screening mammogram for malignant neoplasm of breast    Orders:    BI mammo bilateral screening tomosynthesis; Future    Mixed hyperlipidemia    Orders:    ezetimibe (Zetia) 10 mg tablet; Take 1 tablet (10 mg) by mouth once daily.    Chest congestion    Orders:    XR chest 2 views; Future

## 2025-02-07 NOTE — PATIENT INSTRUCTIONS
Thank you for coming to see me today.  If you have any questions or concerns following our visit, please contact the office.  Phone: [phone]    Follow up in 3 months

## 2025-02-11 ENCOUNTER — HOSPITAL ENCOUNTER (OUTPATIENT)
Dept: RADIOLOGY | Facility: HOSPITAL | Age: 75
Discharge: HOME | End: 2025-02-11
Payer: MEDICARE

## 2025-02-11 DIAGNOSIS — R09.89 CHEST CONGESTION: ICD-10-CM

## 2025-02-11 PROCEDURE — 71046 X-RAY EXAM CHEST 2 VIEWS: CPT | Performed by: RADIOLOGY

## 2025-02-11 PROCEDURE — 71046 X-RAY EXAM CHEST 2 VIEWS: CPT

## 2025-02-13 ENCOUNTER — TELEPHONE (OUTPATIENT)
Dept: PRIMARY CARE | Facility: CLINIC | Age: 75
End: 2025-02-13
Payer: MEDICARE

## 2025-02-13 NOTE — TELEPHONE ENCOUNTER
Patient called and wanted me to let you know that her arms still hurt. She says that you switched her med and is not on the Atorvastatin anymore, and she is now taking the Zetia

## 2025-03-10 ENCOUNTER — HOSPITAL ENCOUNTER (OUTPATIENT)
Dept: RADIOLOGY | Facility: HOSPITAL | Age: 75
Discharge: HOME | End: 2025-03-10
Payer: MEDICARE

## 2025-03-10 VITALS — HEIGHT: 63 IN | WEIGHT: 163 LBS | BODY MASS INDEX: 28.88 KG/M2

## 2025-03-10 DIAGNOSIS — Z12.31 ENCOUNTER FOR SCREENING MAMMOGRAM FOR MALIGNANT NEOPLASM OF BREAST: ICD-10-CM

## 2025-03-10 PROCEDURE — 77067 SCR MAMMO BI INCL CAD: CPT | Performed by: RADIOLOGY

## 2025-03-10 PROCEDURE — 77067 SCR MAMMO BI INCL CAD: CPT

## 2025-03-10 PROCEDURE — 77063 BREAST TOMOSYNTHESIS BI: CPT | Performed by: RADIOLOGY

## 2025-03-11 ENCOUNTER — HOSPITAL ENCOUNTER (OUTPATIENT)
Dept: RADIOLOGY | Facility: CLINIC | Age: 75
Discharge: HOME | End: 2025-03-11
Payer: MEDICARE

## 2025-03-11 ENCOUNTER — TELEPHONE (OUTPATIENT)
Dept: PRIMARY CARE | Facility: CLINIC | Age: 75
End: 2025-03-11
Payer: MEDICARE

## 2025-03-11 DIAGNOSIS — Z78.0 ASYMPTOMATIC MENOPAUSE: ICD-10-CM

## 2025-03-11 PROCEDURE — 77080 DXA BONE DENSITY AXIAL: CPT | Performed by: RADIOLOGY

## 2025-03-11 PROCEDURE — 77080 DXA BONE DENSITY AXIAL: CPT

## 2025-03-11 NOTE — TELEPHONE ENCOUNTER
----- Message from Becca Fuentes sent at 3/10/2025  4:15 PM EDT -----  Mammogram negative for concern of malignancy

## 2025-03-12 ENCOUNTER — TELEPHONE (OUTPATIENT)
Dept: PRIMARY CARE | Facility: CLINIC | Age: 75
End: 2025-03-12
Payer: MEDICARE

## 2025-03-12 NOTE — TELEPHONE ENCOUNTER
Patient called in saying she received a call yesterday afternoon about her DEXA scan did not see an encounter but saw Cori's imaging notes on  the scan and relayed that message to patient.

## 2025-05-21 ENCOUNTER — APPOINTMENT (OUTPATIENT)
Dept: PRIMARY CARE | Facility: CLINIC | Age: 75
End: 2025-05-21
Payer: MEDICARE

## 2025-05-21 VITALS
BODY MASS INDEX: 28.7 KG/M2 | WEIGHT: 162 LBS | HEIGHT: 63 IN | RESPIRATION RATE: 16 BRPM | SYSTOLIC BLOOD PRESSURE: 122 MMHG | TEMPERATURE: 97.6 F | HEART RATE: 60 BPM | DIASTOLIC BLOOD PRESSURE: 60 MMHG | OXYGEN SATURATION: 94 %

## 2025-05-21 DIAGNOSIS — R73.03 PREDIABETES: ICD-10-CM

## 2025-05-21 DIAGNOSIS — K21.9 GASTROESOPHAGEAL REFLUX DISEASE WITHOUT ESOPHAGITIS: ICD-10-CM

## 2025-05-21 DIAGNOSIS — E78.2 MIXED HYPERLIPIDEMIA: Primary | ICD-10-CM

## 2025-05-21 DIAGNOSIS — J30.2 SEASONAL ALLERGIES: ICD-10-CM

## 2025-05-21 DIAGNOSIS — I10 BENIGN ESSENTIAL HYPERTENSION: ICD-10-CM

## 2025-05-21 DIAGNOSIS — M79.622 LEFT UPPER ARM PAIN: ICD-10-CM

## 2025-05-21 PROBLEM — J06.9 VIRAL URI WITH COUGH: Status: RESOLVED | Noted: 2024-10-15 | Resolved: 2025-05-21

## 2025-05-21 PROCEDURE — 1036F TOBACCO NON-USER: CPT

## 2025-05-21 PROCEDURE — 3008F BODY MASS INDEX DOCD: CPT

## 2025-05-21 PROCEDURE — 1125F AMNT PAIN NOTED PAIN PRSNT: CPT

## 2025-05-21 PROCEDURE — 1159F MED LIST DOCD IN RCRD: CPT

## 2025-05-21 PROCEDURE — 1160F RVW MEDS BY RX/DR IN RCRD: CPT

## 2025-05-21 PROCEDURE — 99213 OFFICE O/P EST LOW 20 MIN: CPT

## 2025-05-21 PROCEDURE — 3078F DIAST BP <80 MM HG: CPT

## 2025-05-21 PROCEDURE — 3074F SYST BP LT 130 MM HG: CPT

## 2025-05-21 RX ORDER — MONTELUKAST SODIUM 10 MG/1
10 TABLET ORAL NIGHTLY
Qty: 90 TABLET | Refills: 3 | Status: SHIPPED | OUTPATIENT
Start: 2025-05-21 | End: 2026-05-16

## 2025-05-21 RX ORDER — LOSARTAN POTASSIUM AND HYDROCHLOROTHIAZIDE 25; 100 MG/1; MG/1
1 TABLET ORAL DAILY
Qty: 90 TABLET | Refills: 3 | Status: SHIPPED | OUTPATIENT
Start: 2025-05-21 | End: 2026-05-21

## 2025-05-21 RX ORDER — FLUTICASONE PROPIONATE 50 MCG
1 SPRAY, SUSPENSION (ML) NASAL DAILY
Qty: 16 G | Refills: 2 | Status: SHIPPED | OUTPATIENT
Start: 2025-05-21

## 2025-05-21 RX ORDER — METFORMIN HYDROCHLORIDE 500 MG/1
500 TABLET, EXTENDED RELEASE ORAL DAILY
Qty: 90 TABLET | Refills: 3 | Status: SHIPPED | OUTPATIENT
Start: 2025-05-21 | End: 2026-05-21

## 2025-05-21 RX ORDER — OMEPRAZOLE 40 MG/1
40 CAPSULE, DELAYED RELEASE ORAL DAILY
Qty: 90 CAPSULE | Refills: 3 | Status: SHIPPED | OUTPATIENT
Start: 2025-05-21 | End: 2026-05-21

## 2025-05-21 SDOH — ECONOMIC STABILITY: FOOD INSECURITY: WITHIN THE PAST 12 MONTHS, THE FOOD YOU BOUGHT JUST DIDN'T LAST AND YOU DIDN'T HAVE MONEY TO GET MORE.: NEVER TRUE

## 2025-05-21 SDOH — ECONOMIC STABILITY: FOOD INSECURITY: WITHIN THE PAST 12 MONTHS, YOU WORRIED THAT YOUR FOOD WOULD RUN OUT BEFORE YOU GOT MONEY TO BUY MORE.: NEVER TRUE

## 2025-05-21 ASSESSMENT — PATIENT HEALTH QUESTIONNAIRE - PHQ9
SUM OF ALL RESPONSES TO PHQ9 QUESTIONS 1 AND 2: 0
1. LITTLE INTEREST OR PLEASURE IN DOING THINGS: NOT AT ALL
1. LITTLE INTEREST OR PLEASURE IN DOING THINGS: NOT AT ALL
2. FEELING DOWN, DEPRESSED OR HOPELESS: NOT AT ALL
2. FEELING DOWN, DEPRESSED OR HOPELESS: NOT AT ALL
SUM OF ALL RESPONSES TO PHQ9 QUESTIONS 1 & 2: 0

## 2025-05-21 ASSESSMENT — ENCOUNTER SYMPTOMS
PSYCHIATRIC NEGATIVE: 1
CARDIOVASCULAR NEGATIVE: 1
DEPRESSION: 0
MYALGIAS: 1
NEUROLOGICAL NEGATIVE: 1
LOSS OF SENSATION IN FEET: 0
EYES NEGATIVE: 1
RESPIRATORY NEGATIVE: 1
GASTROINTESTINAL NEGATIVE: 1
HEMATOLOGIC/LYMPHATIC NEGATIVE: 1
CONSTITUTIONAL NEGATIVE: 1
OCCASIONAL FEELINGS OF UNSTEADINESS: 0
ENDOCRINE NEGATIVE: 1

## 2025-05-21 ASSESSMENT — LIFESTYLE VARIABLES
HOW MANY STANDARD DRINKS CONTAINING ALCOHOL DO YOU HAVE ON A TYPICAL DAY: PATIENT DOES NOT DRINK
HOW OFTEN DO YOU HAVE A DRINK CONTAINING ALCOHOL: NEVER
AUDIT-C TOTAL SCORE: 0
SKIP TO QUESTIONS 9-10: 1
HOW OFTEN DO YOU HAVE SIX OR MORE DRINKS ON ONE OCCASION: NEVER

## 2025-05-21 ASSESSMENT — PAIN SCALES - GENERAL: PAINLEVEL_OUTOF10: 8

## 2025-05-21 NOTE — PROGRESS NOTES
"Subjective   Patient ID: Aminta Renae is a 74 y.o. female who presents for evaluation of shoulder pain and 3 month follow up of   Former patient of Becca Fuentes NP who has since left the practice.      Having pain in left upper arm, biceps area. Left worse than right, will hear \"cracking\". Pain is rated as 8-10/10 pain worse with reaching across her body or behind her back, radiates into the back of her arm when reaching behind her back. Has been a problem for the last several months. Was switched from atorvastatin to ezetimibe in February.     Diet: Diet balanced, tries to get fruits and veggies in it. Eating out several times per week, too tired to cook after working in the yard. No routine sugary beverages, diet higher in carbs. Likes pasta. Typically only eating breakfast and dinner. Makes 1 pot coffee, she has a few cups per day. Black  Exercise: Planning on getting another horse, caring for the barn. Active in the yard, weed cutting. Working outside daily for a few hours. Has 8 acre property to care for  Weight: Going up a few lbs but pretty stable  Water: Drinking about  oz per day  Sleep: Trouble sleeping with left arm pain, goes to bed around 10 and up around 6, am. Getting 7-8 hours per night. Has been told she snores when she sleeps on her back  Social: , lives in a house on large property. 2 step children, 2 dogs  Professional: Retired [employer]    Review of Systems   Constitutional: Negative.    HENT: Negative.     Eyes: Negative.    Respiratory: Negative.     Cardiovascular: Negative.    Gastrointestinal: Negative.    Endocrine: Negative.    Genitourinary: Negative.    Musculoskeletal:  Positive for myalgias.   Skin: Negative.    Neurological: Negative.    Hematological: Negative.    Psychiatric/Behavioral: Negative.          Current Medications[1]  Surgical History[2]  Family History[3]   Social History[4]     Objective     Visit Vitals  /60 (BP Location: Right " "arm, Patient Position: Sitting, BP Cuff Size: Adult)   Pulse 60   Temp 36.4 °C (97.6 °F) (Temporal)   Resp 16   Ht 1.6 m (5' 3\")   Wt 73.5 kg (162 lb)   SpO2 94%   BMI 28.70 kg/m²   OB Status Postmenopausal   Smoking Status Never   BSA 1.81 m²        Physical Exam  Constitutional:       Appearance: Normal appearance.   HENT:      Head: Normocephalic and atraumatic.   Eyes:      Extraocular Movements: Extraocular movements intact.      Pupils: Pupils are equal, round, and reactive to light.   Cardiovascular:      Rate and Rhythm: Normal rate and regular rhythm.   Pulmonary:      Effort: Pulmonary effort is normal.      Breath sounds: Normal breath sounds.   Abdominal:      General: Abdomen is flat. Bowel sounds are normal.      Palpations: Abdomen is soft.   Musculoskeletal:         General: Normal range of motion.   Skin:     General: Skin is warm and dry.      Capillary Refill: Capillary refill takes less than 2 seconds.   Neurological:      General: No focal deficit present.      Mental Status: She is alert and oriented to person, place, and time.   Psychiatric:         Mood and Affect: Mood normal.         Behavior: Behavior normal.           Assessment/Plan   Problem List Items Addressed This Visit       Prediabetes - Primary    A1c mildly elevated at 6.2% on labs from 2/2025    Encouraged low carb/low sugar diet  Regular exercise as tolerated          Mixed hyperlipidemia    Lipid panel from 2/2025 well within normal limits  Was switched from atorvastatin to ezetimibe 10mg daily by RIRI Fuentes for presumed muscle pain induced myalgia which didn't solve issue    Stop ezetimibe  Restart atorvastatin 20mg daily for hyperlipidemia          All pertinent lab work and results were reviewed with patient.     Follow up with me in 6 months     JOSE Martinez-CNS         [1]   Current Outpatient Medications   Medication Sig Dispense Refill    ascorbic acid (Strawberry C) 500 mg chewable tablet Chew.      " aspirin 81 mg EC tablet Take by mouth.      calcium carbonate-vitamin D3 600 mg-5 mcg (200 unit) tablet Take 1 tablet by mouth 2 times a day.      fluticasone (Flonase) 50 mcg/actuation nasal spray Administer 1 spray into each nostril once daily. 16 g 2    losartan-hydrochlorothiazide (Hyzaar) 100-25 mg tablet Take 1 tablet by mouth once daily. 90 tablet 1    metFORMIN  mg 24 hr tablet Take 1 tablet (500 mg) by mouth once daily. as directed 90 tablet 1    montelukast (Singulair) 10 mg tablet TAKE 1 TABLET (10 MG) BY MOUTH ONCE DAILY AT BEDTIME. 90 tablet 3    multivitamin with minerals tablet Take 1 tablet by mouth once daily.      omeprazole (PriLOSEC) 40 mg DR capsule Take 1 capsule (40 mg) by mouth once daily. 90 capsule 1    simethicone (Mylicon) 125 mg chewable tablet Chew.       No current facility-administered medications for this visit.   [2]   Past Surgical History:  Procedure Laterality Date    BREAST BIOPSY Right     ex bx [facility] right breast    OTHER SURGICAL HISTORY  11/12/2021    Appendectomy    OTHER SURGICAL HISTORY  11/12/2021    Breast biopsy   [3]   Family History  Family history unknown: Yes   [4]   Social History  Tobacco Use    Smoking status: Never    Smokeless tobacco: Never   Vaping Use    Vaping status: Never Used   Substance Use Topics    Alcohol use: Not Currently     Alcohol/week: 3.0 standard drinks of alcohol     Types: 1 Glasses of wine, 2 Cans of beer per week     Comment: Rarely    Drug use: Never

## 2025-05-21 NOTE — PATIENT INSTRUCTIONS
Thank you for coming to see me today.  If you have any questions or concerns following our visit, please contact the office.  Phone: [phone]    Follow up with me in 6 months or sooner as needed    1)  STOP ezetimbe 10mg daily. START atorvastatin 20mg daily    2) I am referring you to Dr. Rios in ortho shoulder for evaluation of left upper arm pain - please call [phone] to schedule an appointment or schedule at  on your way out

## 2025-05-21 NOTE — ASSESSMENT & PLAN NOTE
A1c mildly elevated at 6.2% on labs from 2/2025    Encouraged low carb/low sugar diet  Regular exercise as tolerated

## 2025-05-21 NOTE — ASSESSMENT & PLAN NOTE
Lipid panel from 2/2025 well within normal limits  Was switched from atorvastatin to ezetimibe 10mg daily by RIRI Fuentes for presumed muscle pain induced myalgia which didn't solve issue    Stop ezetimibe  Restart atorvastatin 20mg daily for hyperlipidemia

## 2025-06-05 DIAGNOSIS — M75.02 ADHESIVE CAPSULITIS OF BOTH SHOULDERS: ICD-10-CM

## 2025-06-05 DIAGNOSIS — M75.01 ADHESIVE CAPSULITIS OF BOTH SHOULDERS: ICD-10-CM

## 2025-06-09 ENCOUNTER — HOSPITAL ENCOUNTER (OUTPATIENT)
Dept: RADIOLOGY | Facility: HOSPITAL | Age: 75
Discharge: HOME | End: 2025-06-09
Payer: MEDICARE

## 2025-06-09 ENCOUNTER — OFFICE VISIT (OUTPATIENT)
Dept: ORTHOPEDIC SURGERY | Facility: HOSPITAL | Age: 75
End: 2025-06-09
Payer: MEDICARE

## 2025-06-09 VITALS — BODY MASS INDEX: 28.7 KG/M2 | HEIGHT: 63 IN | WEIGHT: 162 LBS

## 2025-06-09 DIAGNOSIS — M75.42 IMPINGEMENT SYNDROME OF LEFT SHOULDER: ICD-10-CM

## 2025-06-09 DIAGNOSIS — M75.51 BURSITIS OF RIGHT SHOULDER: ICD-10-CM

## 2025-06-09 DIAGNOSIS — M75.82 ROTATOR CUFF TENDINITIS, LEFT: ICD-10-CM

## 2025-06-09 DIAGNOSIS — M75.41 IMPINGEMENT SYNDROME OF RIGHT SHOULDER: ICD-10-CM

## 2025-06-09 DIAGNOSIS — M75.02 ADHESIVE CAPSULITIS OF BOTH SHOULDERS: ICD-10-CM

## 2025-06-09 DIAGNOSIS — M75.52 BILATERAL SHOULDER BURSITIS: ICD-10-CM

## 2025-06-09 DIAGNOSIS — M75.52 BURSITIS OF LEFT SHOULDER: ICD-10-CM

## 2025-06-09 DIAGNOSIS — M75.51 BILATERAL SHOULDER BURSITIS: ICD-10-CM

## 2025-06-09 DIAGNOSIS — M75.01 ADHESIVE CAPSULITIS OF BOTH SHOULDERS: ICD-10-CM

## 2025-06-09 DIAGNOSIS — M79.622 LEFT UPPER ARM PAIN: ICD-10-CM

## 2025-06-09 DIAGNOSIS — M75.81 ROTATOR CUFF TENDINITIS, RIGHT: Primary | ICD-10-CM

## 2025-06-09 PROCEDURE — 2500000004 HC RX 250 GENERAL PHARMACY W/ HCPCS (ALT 636 FOR OP/ED): Performed by: ORTHOPAEDIC SURGERY

## 2025-06-09 PROCEDURE — 73030 X-RAY EXAM OF SHOULDER: CPT | Mod: 50

## 2025-06-09 PROCEDURE — 73030 X-RAY EXAM OF SHOULDER: CPT | Mod: BILATERAL PROCEDURE | Performed by: RADIOLOGY

## 2025-06-09 PROCEDURE — 99212 OFFICE O/P EST SF 10 MIN: CPT | Performed by: ORTHOPAEDIC SURGERY

## 2025-06-09 PROCEDURE — 20610 DRAIN/INJ JOINT/BURSA W/O US: CPT | Mod: 50 | Performed by: ORTHOPAEDIC SURGERY

## 2025-06-09 RX ORDER — MELOXICAM 15 MG/1
15 TABLET ORAL DAILY
Qty: 30 TABLET | Refills: 4 | Status: SHIPPED | OUTPATIENT
Start: 2025-06-09 | End: 2026-06-09

## 2025-06-09 RX ORDER — LIDOCAINE HYDROCHLORIDE 10 MG/ML
4 INJECTION, SOLUTION INFILTRATION; PERINEURAL
Status: COMPLETED | OUTPATIENT
Start: 2025-06-09 | End: 2025-06-09

## 2025-06-09 RX ORDER — TRIAMCINOLONE ACETONIDE 40 MG/ML
40 INJECTION, SUSPENSION INTRA-ARTICULAR; INTRAMUSCULAR
Status: COMPLETED | OUTPATIENT
Start: 2025-06-09 | End: 2025-06-09

## 2025-06-09 RX ADMIN — LIDOCAINE HYDROCHLORIDE 4 ML: 10 INJECTION, SOLUTION INFILTRATION; PERINEURAL at 14:00

## 2025-06-09 RX ADMIN — TRIAMCINOLONE ACETONIDE 40 MG: 40 INJECTION, SUSPENSION INTRA-ARTICULAR; INTRAMUSCULAR at 14:00

## 2025-06-09 ASSESSMENT — ENCOUNTER SYMPTOMS
DEPRESSION: 0
LOSS OF SENSATION IN FEET: 0
OCCASIONAL FEELINGS OF UNSTEADINESS: 0

## 2025-06-09 NOTE — PROGRESS NOTES
74 y.o. female presents today for evaluation of bilateral shoulder pain left worse than right. The patient reports pain for over a year, getting worse recently.  Wakes her at nighttime.  Worse with overhead and behind the back activities. Pain is controlled. Patient reports no numbness and tingling.  Reports no previous surgeries, injections, or trauma to the area.  Reports pain worse with use, better at rest.   Pain dull ache, sharp at times.     Review of Systems    Constitutional: see HPI, no fever, no chills, not feeling tired, no significant weight gain or weight loss.   Eyes: No vision changes  ENT: no nosebleeds.   Cardiovascular: no chest pain.   Respiratory: no shortness of breath and no cough.   Gastrointestinal: no abdominal pain, no nausea, no vomiting and no diarrhea.   Musculoskeletal: per HPI  Neurological: no headache, no gait disturbances  Psychiatric: no depression and no sleep disturbances.   Endocrine: no muscle weakness and no muscle cramps.   Hematologic/Lymphatic: no swollen glands and no tendency for easy bruising or excessive swelling.     Patient's past medical history, past surgical history, allergies, and medications have been reviewed unless otherwise noted in the chart.     Shoulder:  Inspection:  no evidence of infection, no erythema, no edema, no ecchymosis, Palpation:  compartments are soft  Skin:  intact, Vascular:  capillary refill <2 seconds distally, Sensation:  sensation intact to light touch distally; AROM- Abduction 90, elevation to 165 degrees, ER 90 degrees, IR 90 degrees and L3;  TTP at the biceps tendon,  NTTP at AC joint; TTP on the lateral deltoid Special- painful Cheng test, Neer's Test, cross body test; painful Empty can test/Drop Arm test;  negative Yergason's/Speed's Test;   painful belly pressed and posterior liftoff     Constitutional   General appearance: Alert and in no acute distress. Well developed, well nourished.    Eyes   External Eye, Conjunctiva and  lids: Normal external exam - pupils were equal in size, round, reactive to light (PERRL) with normal accommodation and extraocular movements intact (EOMI).   Ears, Nose, Mouth, and Throat   Hearing: Normal.   Neck   Neck: No neck mass was observed. Supple.   Pulmonary   Respiratory effort: No respiratory distress.   Cardiovascular   Examination of extremities: No peripheral edema.   Psychiatric   Judgment and insight: Intact.   Orientation to person, place, and time: Alert and oriented x 3.       Mood and affect: Normal.      XR - no fractures, no dislocations, or no osseous abnormalities bilateral shoulders    X-rays were personally reviewed by me. Radiology reports were reviewed by me as well, if available at the time.      Bilateral rotator cuff tendinitis, bursitis, impingement syndrome  Based on the history, physical exam and imaging studies above, the patient's presentation is consistent with the above diagnosis.  I had a long discussion with the patient regarding their presentation and the treatment options.  We discussed initial nonoperative versus operative management options as well as potential further diagnostic imaging.  We again discussed her treatment options going forward along with their associated risks and benefits. After thorough discussion, the patient has elected to proceed with conservative management. All questions were answered to the patients satisfaction who seems satisfied with the plan.  They will call the office with any questions/concerns.    Discussion I discussed the diagnosis and treatment options with the patient today along with their associated risks and benefits. After thorough discussion, the patient has elected to proceed with a corticosteroid injection with Kenalog and Xylocaine, which was performed in the office today under aseptic technique and the patient tolerated the procedure well.          Ortho Injections:           Injection site bilateral shoulders. Medication 4 cc  1% Xylocaine, 1 cc 40 mg Kenalog, Injection given under sterile conditions. No immediate complications noted. Post injection instructions given.  Mobic 15 mg daily  Physical therapy evaluation and treatment  Follow-up 8 weeks as needed no x-ray    Patient ID: Aminta Renae is a 74 y.o. female.    L Inj/Asp: bilateral glenohumeral on 6/9/2025 2:00 PM  Indications: pain  Details: 22 G needle, posterior approach  Medications (Right): 40 mg triamcinolone acetonide 40 mg/mL; 4 mL lidocaine 10 mg/mL (1 %)  Medications (Left): 40 mg triamcinolone acetonide 40 mg/mL; 4 mL lidocaine 10 mg/mL (1 %)  Outcome: tolerated well, no immediate complications  Procedure, treatment alternatives, risks and benefits explained, specific risks discussed. Consent was given by the patient. Immediately prior to procedure a time out was called to verify the correct patient, procedure, equipment, support staff and site/side marked as required. Patient was prepped and draped in the usual sterile fashion.

## 2025-06-17 ENCOUNTER — APPOINTMENT (OUTPATIENT)
Dept: PHYSICAL THERAPY | Facility: HOSPITAL | Age: 75
End: 2025-06-17
Payer: MEDICARE

## 2025-06-17 ENCOUNTER — EVALUATION (OUTPATIENT)
Dept: PHYSICAL THERAPY | Facility: HOSPITAL | Age: 75
End: 2025-06-17
Payer: MEDICARE

## 2025-06-17 DIAGNOSIS — M75.51 BILATERAL SHOULDER BURSITIS: Primary | ICD-10-CM

## 2025-06-17 DIAGNOSIS — M75.52 BILATERAL SHOULDER BURSITIS: Primary | ICD-10-CM

## 2025-06-17 PROCEDURE — 97161 PT EVAL LOW COMPLEX 20 MIN: CPT | Mod: GP | Performed by: PHYSICAL THERAPIST

## 2025-06-17 ASSESSMENT — ENCOUNTER SYMPTOMS
OCCASIONAL FEELINGS OF UNSTEADINESS: 0
LOSS OF SENSATION IN FEET: 0
DEPRESSION: 0

## 2025-06-17 ASSESSMENT — PAIN SCALES - GENERAL: PAINLEVEL_OUTOF10: 3

## 2025-06-17 ASSESSMENT — PATIENT HEALTH QUESTIONNAIRE - PHQ9: 1. LITTLE INTEREST OR PLEASURE IN DOING THINGS: NOT AT ALL

## 2025-06-17 ASSESSMENT — PAIN DESCRIPTION - DESCRIPTORS: DESCRIPTORS: ACHING;SHARP

## 2025-06-17 ASSESSMENT — PAIN - FUNCTIONAL ASSESSMENT: PAIN_FUNCTIONAL_ASSESSMENT: 0-10

## 2025-06-17 NOTE — PROGRESS NOTES
Physical Therapy    Physical Therapy Evaluation    Patient Name: Aminta Renae  MRN: [id number]  Today's Date: 6/17/2025  Time Calculation  Start Time: 1115  Stop Time: 1200  Time Calculation (min): 45 min  Visit # 1  PT Evaluation Time Entry  PT Evaluation (Low) Time Entry: 45                    Assessment pt presents with bilateral shoulder pain, stiffness and weakness and would benefit from continued skilled PT services     Plan  Follow 2x a week x 4-6 weeks for  Treatment/Interventions: Cryotherapy, Education/ Instruction, Hot pack, Manual therapy, Neuromuscular re-education, Therapeutic activities, Therapeutic exercises, Ultrasound    Current Problem  1. Bilateral shoulder bursitis  Referral to Physical Therapy    Follow Up In Physical Therapy        Subjective bilateral shoulder pain, left worse than right, has been having pain for about a year, but pain continued to increase. She finally sought treatment this month. She had recent injections in both shoulders on June 9th, and this has helped. Before injections, pain level on left was at least an 8/10, she would have snapping pain and stiffness. Since injection, pain is about 3/10 . Able to sleep through the night since injections. No follow up scheduled  Precautions:  Precautions  STEADI Fall Risk Score (The score of 4 or more indicates an increased risk of falling): 0  Pain:  Pain Assessment: 0-10  0-10 (Numeric) Pain Score: 3  Pain Location: Shoulder  Pain Orientation: Right, Left  Pain Descriptors: Aching, Sharp  Pain Frequency: Constant/continuous  Home Living lives with , 2 steps to enter, one level,      Prior Function Per Pt/Caregiver Report:  retired, likes to sew, would like to get another horse, so will need to be able to take care of barn.      Objective      Range of Motion:Shoulder ROM  WFL unless documented below   Flexion Abduction IR ER Fxn IR reach Fxn ER reach   RIGHT 117/137, 5/5 118/155, 4+/5 35, 5/5 70, 4/5 T12 T1   LEFT 130/158, 4+  100/132, 4 40, 4+ 72, 5 T9] C6             Palpation: right AC joint, med scap border, left med scap border     Special Tests:      Gait: WNL     Balance: not formally tested during eval but no loss of balance noted     Stairs: WNL     Outcome Measures:  Other Measures  Disability of Arm Shoulder Hand (DASH): 18 raw score     OP EDUCATION: will order pulley for home use instructed pt in use of pulley  EXERCISES       Date 6/17/25      VISIT# #1 # # #    REPS REPS REPS REPS   Nu Step       :Pulleys        scaption                           flexion                            IR       Theraband     lat pull down                              Mid row                             IR                             ER                            Paloff press                     Manual techniques  PROM all planes, sidelying scap mobes, TPR                                                                                                                                     HEP              Goals:  Active       PT Problem       PT Goal 1       Start:  06/17/25    Expected End:  09/17/25       Pt will be independent with HEP to further promote progression of strength, ROM, and endurance.          PT Goal 2       Start:  06/17/25    Expected End:  09/17/25       Increased bilateral shoulder AROM to WFL all planes to allow greater ease with ADL's and household tasks          PT Goal 3       Start:  06/17/25    Expected End:  09/17/25       Increased bilateral UE affected muscle groups to 5/5 to allow greater ease with ADL's and household tasks          PT Goal 4       Start:  06/17/25    Expected End:  09/17/25       Decreased pain in bilateral shoulders to 1-2/10 max with activity to allow greater ease with ADL's and household tasks

## 2025-06-18 ENCOUNTER — APPOINTMENT (OUTPATIENT)
Dept: PHYSICAL THERAPY | Facility: HOSPITAL | Age: 75
End: 2025-06-18
Payer: MEDICARE

## 2025-06-24 ENCOUNTER — TREATMENT (OUTPATIENT)
Dept: PHYSICAL THERAPY | Facility: HOSPITAL | Age: 75
End: 2025-06-24
Payer: MEDICARE

## 2025-06-24 ENCOUNTER — DOCUMENTATION (OUTPATIENT)
Dept: PHYSICAL THERAPY | Facility: HOSPITAL | Age: 75
End: 2025-06-24
Payer: MEDICARE

## 2025-06-24 DIAGNOSIS — M75.51 BILATERAL SHOULDER BURSITIS: ICD-10-CM

## 2025-06-24 DIAGNOSIS — M75.52 BILATERAL SHOULDER BURSITIS: ICD-10-CM

## 2025-06-24 DIAGNOSIS — B02.7 DISSEMINATED HERPES ZOSTER: Primary | ICD-10-CM

## 2025-06-24 RX ORDER — VALACYCLOVIR HYDROCHLORIDE 1 G/1
1000 TABLET, FILM COATED ORAL 3 TIMES DAILY
Qty: 21 TABLET | Refills: 0 | Status: SHIPPED | OUTPATIENT
Start: 2025-06-24 | End: 2025-07-01

## 2025-06-24 NOTE — PROGRESS NOTES
Physical Therapy                 Therapy Communication Note    Patient Name: Aminta Renae  MRN: [id number]  : 1950  Today's Date: 2025     Discipline: Physical Therapy    Missed Time: Cancel    Missed Visit Reason:  Pt has shingles.  She will return to therapy after her blisters are gone per her doctor.  POC was discussed with PT Iman

## 2025-06-24 NOTE — PROGRESS NOTES
Received message from FITO Rudolph reporting patient with fluid filled blisters along her hip bone and right side of her back along a line. Patient reports itching stinging and 4/10 pain.   Presume shingles- will start her on valacyclovir 1g TID x 7 days. Advised to reschedule PT to next week once blisters resolved.

## 2025-06-25 ENCOUNTER — APPOINTMENT (OUTPATIENT)
Dept: PHYSICAL THERAPY | Facility: HOSPITAL | Age: 75
End: 2025-06-25
Payer: MEDICARE

## 2025-06-26 ENCOUNTER — APPOINTMENT (OUTPATIENT)
Dept: PHYSICAL THERAPY | Facility: HOSPITAL | Age: 75
End: 2025-06-26
Payer: MEDICARE

## 2025-06-30 ENCOUNTER — APPOINTMENT (OUTPATIENT)
Dept: PHYSICAL THERAPY | Facility: HOSPITAL | Age: 75
End: 2025-06-30
Payer: MEDICARE

## 2025-07-03 ENCOUNTER — TREATMENT (OUTPATIENT)
Dept: PHYSICAL THERAPY | Facility: HOSPITAL | Age: 75
End: 2025-07-03
Payer: MEDICARE

## 2025-07-03 DIAGNOSIS — M75.52 BILATERAL SHOULDER BURSITIS: ICD-10-CM

## 2025-07-03 DIAGNOSIS — M75.51 BILATERAL SHOULDER BURSITIS: ICD-10-CM

## 2025-07-03 PROCEDURE — 97110 THERAPEUTIC EXERCISES: CPT | Mod: GP,CQ

## 2025-07-03 ASSESSMENT — PAIN DESCRIPTION - DESCRIPTORS: DESCRIPTORS: ACHING;DULL

## 2025-07-03 ASSESSMENT — PAIN SCALES - GENERAL: PAINLEVEL_OUTOF10: 3

## 2025-07-03 ASSESSMENT — PAIN - FUNCTIONAL ASSESSMENT: PAIN_FUNCTIONAL_ASSESSMENT: 0-10

## 2025-07-03 NOTE — PROGRESS NOTES
Physical Therapy    Physical Therapy Treatment    Patient Name: Aminta Renae  MRN: [id number]  : 1950  Today's Date: 7/3/2025  Time Calculation  Start Time: 248  Stop Time: 335  Time Calculation (min): 47 min    PT Therapeutic Procedures Time Entry  Therapeutic Exercise Time Entry: 47          VISIT:# 2    Current Problem  Problem List Items Addressed This Visit    None  Visit Diagnoses         Codes      Bilateral shoulder bursitis     M75.51, M75.52             Subjective    Pt reports her shingles have scabbed over now and her pain is less.       Pain  Pain Assessment: 0-10  0-10 (Numeric) Pain Score: 3  Pain Location: Shoulder  Pain Orientation: Left  Pain Descriptors: Aching, Dull          Objective    Access Code: [email or web address]  URL: [email or web address]  Date: 2025  Prepared by: Corbin Rudolph    Exercises  - Seated Shoulder Flexion AAROM with Pulley Behind  - 1-2 x daily - 5-7 x weekly - 1 sets - 1 reps - 3 mins hold  - Seated Shoulder Scaption AAROM with Pulley at Side  - 1-2 x daily - 5-7 x weekly - 1 sets - 1 reps - 3 min hold  - Standing Shoulder Internal Rotation AAROM with Pulley  - 1-2 x daily - 5-7 x weekly - 1 sets - 1 reps - 3 mins hold  - Shoulder Extension with Resistance  - 1-2 x daily - 5-7 x weekly - 3 sets - 10 reps  - Standing Shoulder Row with Anchored Resistance  - 1-2 x daily - 5-7 x weekly - 3 sets - 10 reps  - Shoulder Internal Rotation with Resistance  - 1-2 x daily - 5-7 x weekly - 2 sets - 10 reps  - Shoulder Internal Rotation with Resistance (Mirrored)  - 1-2 x daily - 5-7 x weekly - 3 sets - 10 reps             Precautions  Precautions  STEADI Fall Risk Score (The score of 4 or more indicates an increased risk of falling): 0  Precautions Comment: none      Treatments:  Therapeutic Exercise  Therapeutic Exercise Activity 1: pulleys flex/scap/IR natalia 3' ea  Therapeutic Exercise Activity 2: tband pull downs red 2 x 10  Therapeutic Exercise Activity  3: tband mid rows 2 x 10 red  Therapeutic Exercise Activity 4: tbnad paloff red 2 x 10 each way  Therapeutic Exercise Activity 5: Nustep (NEXT VISIT)        Manual Therapy  Manual Therapy Activity 1: next                 Assessment:   Pt understands new HEP.    Outpatient Education  Individual(s) Educated: Patient  Education Provided: Home Exercise Program  Equipment: Thera-Band (red)  Risk and Benefits Discussed with Patient/Caregiver/Other: yes  Patient/Caregiver Demonstrated Understanding: yes  Plan of Care Discussed and Agreed Upon: yes  Patient Response to Education: Patient/Caregiver Verbalized Understanding of Information, Patient/Caregiver Performed Return Demonstration of Exercises/Activities, Patient/Caregiver Asked Appropriate Questions  Education Comment: [email or web address]    Plan: Cont to progress strength and ROM  OP PT Plan  Treatment/Interventions: Cryotherapy, Education/ Instruction, Hot pack, Manual therapy, Neuromuscular re-education, Therapeutic activities, Therapeutic exercises, Ultrasound    Goals:  Active       PT Problem       PT Goal 1       Start:  06/17/25    Expected End:  09/17/25       Pt will be independent with HEP to further promote progression of strength, ROM, and endurance.          PT Goal 2       Start:  06/17/25    Expected End:  09/17/25       Increased bilateral shoulder AROM to WFL all planes to allow greater ease with ADL's and household tasks          PT Goal 3       Start:  06/17/25    Expected End:  09/17/25       Increased bilateral UE affected muscle groups to 5/5 to allow greater ease with ADL's and household tasks          PT Goal 4       Start:  06/17/25    Expected End:  09/17/25       Decreased pain in bilateral shoulders to 1-2/10 max with activity to allow greater ease with ADL's and household tasks

## 2025-07-08 ENCOUNTER — TREATMENT (OUTPATIENT)
Dept: PHYSICAL THERAPY | Facility: HOSPITAL | Age: 75
End: 2025-07-08
Payer: MEDICARE

## 2025-07-08 DIAGNOSIS — M75.52 BILATERAL SHOULDER BURSITIS: ICD-10-CM

## 2025-07-08 DIAGNOSIS — M75.51 BILATERAL SHOULDER BURSITIS: ICD-10-CM

## 2025-07-08 PROCEDURE — 97110 THERAPEUTIC EXERCISES: CPT | Mod: GP | Performed by: PHYSICAL THERAPIST

## 2025-07-08 ASSESSMENT — PAIN SCALES - GENERAL: PAINLEVEL_OUTOF10: 1

## 2025-07-08 ASSESSMENT — PAIN - FUNCTIONAL ASSESSMENT: PAIN_FUNCTIONAL_ASSESSMENT: 0-10

## 2025-07-08 NOTE — PROGRESS NOTES
Physical Therapy    Physical Therapy Treatment    Patient Name: Aminta Renae  MRN: [id number]  Today's Date: 7/8/2025  Visit #3  Time Calculation  Start Time: 1030  Stop Time: 1115  Time Calculation (min): 45 min     PT Therapeutic Procedures Time Entry  Therapeutic Exercise Time Entry: 45                   Assessment: pt did well with there ex this session, felt decreased stiffness     Plan: continue with bilateral shoulder AROM and strength activities, manual techniques as needed, add theraband IR and ER next session       Current Problem  1. Bilateral shoulder bursitis  Follow Up In Physical Therapy              Subjective  not much pain in shoulders today, no longer having, doing ok with HEP and pulleys  Precautions  Precautions  Precautions Comment: none  Pain  Pain Assessment  Pain Assessment: 0-10  0-10 (Numeric) Pain Score: 1  Pain Location: Shoulder  Pain Orientation: Left, Right    Objective  tenderness bilateral supraspinatus, needs v cues for proper form on pulleys                    Treatments:    Therapeutic Exercise  Therapeutic Exercise Activity 1: pulleys flex/scap/IR natalia 3' ea  Therapeutic Exercise Activity 2: tband pull downs red 2 x 10  Therapeutic Exercise Activity 3: tband mid rows 2 x 10 red  Therapeutic Exercise Activity 4: tbnad paloff red 2 x 10 each way  Therapeutic Exercise Activity 5: Nustep (15 min L2)                      OP EDUCATION: reviewed HEP     Goals:  Active       PT Problem       PT Goal 1       Start:  06/17/25    Expected End:  09/17/25       Pt will be independent with HEP to further promote progression of strength, ROM, and endurance.          PT Goal 2       Start:  06/17/25    Expected End:  09/17/25       Increased bilateral shoulder AROM to WFL all planes to allow greater ease with ADL's and household tasks          PT Goal 3       Start:  06/17/25    Expected End:  09/17/25       Increased bilateral UE affected muscle groups to 5/5 to allow greater ease with ADL's and  household tasks          PT Goal 4       Start:  06/17/25    Expected End:  09/17/25       Decreased pain in bilateral shoulders to 1-2/10 max with activity to allow greater ease with ADL's and household tasks

## 2025-07-10 ENCOUNTER — APPOINTMENT (OUTPATIENT)
Dept: PHYSICAL THERAPY | Facility: HOSPITAL | Age: 75
End: 2025-07-10
Payer: MEDICARE

## 2025-07-14 ENCOUNTER — TREATMENT (OUTPATIENT)
Dept: PHYSICAL THERAPY | Facility: HOSPITAL | Age: 75
End: 2025-07-14
Payer: MEDICARE

## 2025-07-14 DIAGNOSIS — M75.52 BILATERAL SHOULDER BURSITIS: ICD-10-CM

## 2025-07-14 DIAGNOSIS — M75.51 BILATERAL SHOULDER BURSITIS: ICD-10-CM

## 2025-07-14 PROCEDURE — 97110 THERAPEUTIC EXERCISES: CPT | Mod: GP,CQ

## 2025-07-14 ASSESSMENT — PAIN SCALES - GENERAL: PAINLEVEL_OUTOF10: 0 - NO PAIN

## 2025-07-14 ASSESSMENT — PAIN - FUNCTIONAL ASSESSMENT: PAIN_FUNCTIONAL_ASSESSMENT: 0-10

## 2025-07-14 NOTE — PROGRESS NOTES
Physical Therapy    Physical Therapy Treatment    Patient Name: Aminta Renae  MRN: [id number]  : 1950  Today's Date: 2025  Time Calculation  Start Time: 915  Stop Time:   Time Calculation (min): 45 min    PT Therapeutic Procedures Time Entry  Therapeutic Exercise Time Entry: 45          VISIT:# 4    Current Problem  Problem List Items Addressed This Visit    None  Visit Diagnoses         Codes      Bilateral shoulder bursitis     M75.51, M75.52             Subjective    Pt is feeling better     Pain  Pain Assessment: 0-10  0-10 (Numeric) Pain Score: 0 - No pain  Pain Location: Shoulder  Pain Orientation: Left, Right  Pain Descriptors:  (clicking)          Objective    Patient is independent with current HEP.               Precautions  Precautions  STEADI Fall Risk Score (The score of 4 or more indicates an increased risk of falling): 0  Precautions Comment: none      Treatments:  Therapeutic Exercise  Therapeutic Exercise Activity 1: pulleys flex/scap/IR natalia 2' ea  Therapeutic Exercise Activity 2: tband pull downs g 2 x 10  Therapeutic Exercise Activity 3: tband mid rows 2 x 10 gr  Therapeutic Exercise Activity 4: tband paloff NOT TODAY  Therapeutic Exercise Activity 5: Tband IR 2 x 10 red  Therapeutic Exercise Activity 6: Tband ER 2 x 10 red  Therapeutic Exercise Activity 8: Nustep 10' @L2                          Assessment:   Pt tolerated treatment without complaint of increase in symptoms.  Pt required cues with IR/ER.          Plan: Issue new HEP and green tband  OP PT Plan  Treatment/Interventions: Cryotherapy, Education/ Instruction, Hot pack, Manual therapy, Neuromuscular re-education, Therapeutic activities, Therapeutic exercises, Ultrasound    Goals:  Active       PT Problem       PT Goal 1       Start:  25    Expected End:  25       Pt will be independent with HEP to further promote progression of strength, ROM, and endurance.          PT Goal 2       Start:  25     Expected End:  09/17/25       Increased bilateral shoulder AROM to WFL all planes to allow greater ease with ADL's and household tasks          PT Goal 3       Start:  06/17/25    Expected End:  09/17/25       Increased bilateral UE affected muscle groups to 5/5 to allow greater ease with ADL's and household tasks          PT Goal 4       Start:  06/17/25    Expected End:  09/17/25       Decreased pain in bilateral shoulders to 1-2/10 max with activity to allow greater ease with ADL's and household tasks

## 2025-07-16 ENCOUNTER — TREATMENT (OUTPATIENT)
Dept: PHYSICAL THERAPY | Facility: HOSPITAL | Age: 75
End: 2025-07-16
Payer: MEDICARE

## 2025-07-16 DIAGNOSIS — M75.51 BILATERAL SHOULDER BURSITIS: ICD-10-CM

## 2025-07-16 DIAGNOSIS — M75.52 BILATERAL SHOULDER BURSITIS: ICD-10-CM

## 2025-07-16 PROCEDURE — 97110 THERAPEUTIC EXERCISES: CPT | Mod: GP,CQ

## 2025-07-16 ASSESSMENT — PAIN SCALES - GENERAL: PAINLEVEL_OUTOF10: 0 - NO PAIN

## 2025-07-16 ASSESSMENT — PAIN - FUNCTIONAL ASSESSMENT: PAIN_FUNCTIONAL_ASSESSMENT: 0-10

## 2025-07-16 NOTE — PROGRESS NOTES
Physical Therapy    Physical Therapy Treatment    Patient Name: Aminta Renae  MRN: [id number]  : 1950  Today's Date: 2025  Time Calculation  Start Time: 900  Stop Time: 945  Time Calculation (min): 45 min    PT Therapeutic Procedures Time Entry  Therapeutic Exercise Time Entry: 45          VISIT:# 5    Current Problem  Problem List Items Addressed This Visit    None  Visit Diagnoses         Codes      Bilateral shoulder bursitis     M75.51, M75.52             Subjective    Pt reports she is feeling better and is doing her HEP      Pain  Pain Assessment: 0-10  0-10 (Numeric) Pain Score: 0 - No pain  Pain Location: Shoulder  Pain Orientation: Left, Right          Objective    AROM shoulder flexion  L 158 deg    R 140 deg   AROM shoulder scaption L 140 deg  R 138 deg      Access Code: [email or web address]  URL: [email or web address]  Date: 2025  Prepared by: Corbin Rudolph    Exercises  - Shoulder External Rotation with Anchored Resistance  - 1-2 x daily - 5-7 x weekly - 3 sets - 10 reps - red hold  - Shoulder Internal Rotation with Resistance  - 1-2 x daily - 5-7 x weekly - 3 sets - 10 reps - red hold       Precautions  Precautions  STEADI Fall Risk Score (The score of 4 or more indicates an increased risk of falling): 0  Precautions Comment: none      Treatments:  Therapeutic Exercise  Therapeutic Exercise Activity 1: wall push-ups 2 x 10  Therapeutic Exercise Activity 2: tband pull downs gr 2 x20  Therapeutic Exercise Activity 3: tband mid rows 2 x 20 gr  Therapeutic Exercise Activity 4: tband paloff gr 20x  Therapeutic Exercise Activity 5: Tband IR 2 x 10 red  Therapeutic Exercise Activity 6: Tband ER 2 x 10 red  Therapeutic Exercise Activity 8: Nustep 10' @L2                          Assessment:   ROM has improved  Outpatient Education  Individual(s) Educated: Patient  Education Provided: Home Exercise Program  Equipment: Thera-Band (green)  Risk and Benefits Discussed with  Patient/Caregiver/Other: yes  Patient/Caregiver Demonstrated Understanding: yes  Plan of Care Discussed and Agreed Upon: yes  Patient Response to Education: Patient/Caregiver Verbalized Understanding of Information, Patient/Caregiver Performed Return Demonstration of Exercises/Activities, Patient/Caregiver Asked Appropriate Questions  Education Comment: [id number]    Plan: Recheck   OP PT Plan  Treatment/Interventions: Cryotherapy, Education/ Instruction, Hot pack, Manual therapy, Neuromuscular re-education, Therapeutic activities, Therapeutic exercises, Ultrasound    Goals:  Active       PT Problem       PT Goal 1       Start:  06/17/25    Expected End:  09/17/25       Pt will be independent with HEP to further promote progression of strength, ROM, and endurance.          PT Goal 2       Start:  06/17/25    Expected End:  09/17/25       Increased bilateral shoulder AROM to WFL all planes to allow greater ease with ADL's and household tasks          PT Goal 3       Start:  06/17/25    Expected End:  09/17/25       Increased bilateral UE affected muscle groups to 5/5 to allow greater ease with ADL's and household tasks          PT Goal 4       Start:  06/17/25    Expected End:  09/17/25       Decreased pain in bilateral shoulders to 1-2/10 max with activity to allow greater ease with ADL's and household tasks

## 2025-07-22 ENCOUNTER — TREATMENT (OUTPATIENT)
Dept: PHYSICAL THERAPY | Facility: HOSPITAL | Age: 75
End: 2025-07-22
Payer: MEDICARE

## 2025-07-22 DIAGNOSIS — M75.52 BILATERAL SHOULDER BURSITIS: ICD-10-CM

## 2025-07-22 DIAGNOSIS — M75.51 BILATERAL SHOULDER BURSITIS: ICD-10-CM

## 2025-07-22 PROCEDURE — 97110 THERAPEUTIC EXERCISES: CPT | Mod: GP | Performed by: PHYSICAL THERAPIST

## 2025-07-22 ASSESSMENT — PAIN - FUNCTIONAL ASSESSMENT: PAIN_FUNCTIONAL_ASSESSMENT: 0-10

## 2025-07-22 ASSESSMENT — PAIN SCALES - GENERAL: PAINLEVEL_OUTOF10: 0 - NO PAIN

## 2025-07-22 NOTE — PROGRESS NOTES
Physical Therapy    Physical Therapy Treatment/Discharge Summary    Patient Name: Aminta Renae  MRN: [id number]  Today's Date: 7/22/2025  Visit #6  Time Calculation  Start Time: 0945  Stop Time: 1025  Time Calculation (min): 40 min     PT Therapeutic Procedures Time Entry  Therapeutic Exercise Time Entry: 40                   Assessment: pt is independent with HEP, is in agreement with discharge. She is aware of how to return to PT if needed.    Plan: Discharge to [facility]       Current Problem  1. Bilateral shoulder bursitis  Follow Up In Physical Therapy              Subjective  pt states that her shoulders are feeling good, she feels ready for discharge. No longer having pain. Doing all household chores   Precautions  Precautions  Precautions Comment: none  Pain  Pain Assessment  Pain Assessment: 0-10  0-10 (Numeric) Pain Score: 0 - No pain  Pain Location: Shoulder  Pain Orientation: Left, Right    Objective    Shoulder AROM and Strength  WFL unless documented below   Flexion Abduction IR ER Fxn IR reach Fxn ER reach   RIGHT 155 deg, 5/5 160 deg 5/5 73 deg 5/5 85 deg 4+/5 T9 T3   LEFT 156 deg, 5/5 168 deg 5/5 60 deg 5/5 68 deg 4+/5 T9 T2      Other Measures  Disability of Arm Shoulder Hand (DASH): 11 raw score           Outcome Measures:  Other Measures  Disability of Arm Shoulder Hand (DASH): 11 raw score  Treatments:    Therapeutic Exercise  Therapeutic Exercise Activity 1: wall push-ups 2 x 10  Therapeutic Exercise Activity 2: tband pull downs gr 2 x20  Therapeutic Exercise Activity 3: tband mid rows 2 x 20 gr  Therapeutic Exercise Activity 4: tband paloff gr 20x  Therapeutic Exercise Activity 5: Tband IR 2 x 10 red  Therapeutic Exercise Activity 6: Tband ER 2 x 10 red  Therapeutic Exercise Activity 8: Nustep 10' @L2                      OP EDUCATION: reviewed HEP     Goals:  Resolved       PT Problem       PT Goal 1 (Met)       Start:  06/17/25    Expected End:  09/17/25    Resolved:  07/22/25    Pt will be  independent with HEP to further promote progression of strength, ROM, and endurance.          PT Goal 2 (Met)       Start:  06/17/25    Expected End:  09/17/25    Resolved:  07/22/25    Increased bilateral shoulder AROM to WFL all planes to allow greater ease with ADL's and household tasks          PT Goal 3 (Progressing)       Start:  06/17/25    Expected End:  09/17/25    Resolved:  07/22/25    Increased bilateral UE affected muscle groups to 5/5 to allow greater ease with ADL's and household tasks          PT Goal 4 (Progressing)       Start:  06/17/25    Expected End:  09/17/25    Resolved:  07/22/25    Decreased pain in bilateral shoulders to 1-2/10 max with activity to allow greater ease with ADL's and household tasks

## 2025-08-08 ENCOUNTER — TELEPHONE (OUTPATIENT)
Dept: PRIMARY CARE | Facility: CLINIC | Age: 75
End: 2025-08-08
Payer: MEDICARE

## 2025-08-08 NOTE — TELEPHONE ENCOUNTER
Patient called in regarding her ezetimibe that Crissy was talking about changing her to Atorvastin instead.    If this change is made she needs a script sent to [facility] in [place].

## 2025-11-24 ENCOUNTER — APPOINTMENT (OUTPATIENT)
Dept: PRIMARY CARE | Facility: CLINIC | Age: 75
End: 2025-11-24
Payer: MEDICARE

## 2026-02-09 ENCOUNTER — APPOINTMENT (OUTPATIENT)
Dept: PRIMARY CARE | Facility: CLINIC | Age: 76
End: 2026-02-09
Payer: MEDICARE